# Patient Record
Sex: MALE | Race: WHITE | NOT HISPANIC OR LATINO
[De-identification: names, ages, dates, MRNs, and addresses within clinical notes are randomized per-mention and may not be internally consistent; named-entity substitution may affect disease eponyms.]

---

## 2019-07-16 ENCOUNTER — APPOINTMENT (OUTPATIENT)
Dept: CARDIOLOGY | Facility: CLINIC | Age: 69
End: 2019-07-16

## 2019-09-12 ENCOUNTER — APPOINTMENT (OUTPATIENT)
Dept: CARDIOLOGY | Facility: CLINIC | Age: 69
End: 2019-09-12
Payer: MEDICARE

## 2019-09-12 PROCEDURE — 93000 ELECTROCARDIOGRAM COMPLETE: CPT

## 2019-09-12 PROCEDURE — 99213 OFFICE O/P EST LOW 20 MIN: CPT

## 2019-12-27 ENCOUNTER — APPOINTMENT (OUTPATIENT)
Dept: CARDIOLOGY | Facility: CLINIC | Age: 69
End: 2019-12-27
Payer: MEDICARE

## 2019-12-27 PROCEDURE — 99213 OFFICE O/P EST LOW 20 MIN: CPT

## 2019-12-27 PROCEDURE — 93000 ELECTROCARDIOGRAM COMPLETE: CPT

## 2020-07-10 ENCOUNTER — APPOINTMENT (OUTPATIENT)
Dept: CARDIOLOGY | Facility: CLINIC | Age: 70
End: 2020-07-10
Payer: MEDICARE

## 2020-07-10 PROCEDURE — 93000 ELECTROCARDIOGRAM COMPLETE: CPT

## 2020-07-10 PROCEDURE — 99213 OFFICE O/P EST LOW 20 MIN: CPT

## 2020-09-05 ENCOUNTER — RX RENEWAL (OUTPATIENT)
Age: 70
End: 2020-09-05

## 2020-09-17 ENCOUNTER — APPOINTMENT (OUTPATIENT)
Dept: CARDIOLOGY | Facility: CLINIC | Age: 70
End: 2020-09-17
Payer: MEDICARE

## 2020-09-17 ENCOUNTER — RESULT REVIEW (OUTPATIENT)
Age: 70
End: 2020-09-17

## 2020-09-17 ENCOUNTER — OUTPATIENT (OUTPATIENT)
Dept: OUTPATIENT SERVICES | Facility: HOSPITAL | Age: 70
LOS: 1 days | Discharge: HOME | End: 2020-09-17
Payer: MEDICARE

## 2020-09-17 DIAGNOSIS — Z13.6 ENCOUNTER FOR SCREENING FOR CARDIOVASCULAR DISORDERS: ICD-10-CM

## 2020-09-17 PROCEDURE — 93306 TTE W/DOPPLER COMPLETE: CPT

## 2020-09-17 PROCEDURE — 78452 HT MUSCLE IMAGE SPECT MULT: CPT | Mod: 26

## 2020-10-01 DIAGNOSIS — R07.9 CHEST PAIN, UNSPECIFIED: ICD-10-CM

## 2020-10-01 DIAGNOSIS — I25.9 CHRONIC ISCHEMIC HEART DISEASE, UNSPECIFIED: ICD-10-CM

## 2020-10-13 ENCOUNTER — RECORD ABSTRACTING (OUTPATIENT)
Age: 70
End: 2020-10-13

## 2020-10-13 DIAGNOSIS — Z86.79 PERSONAL HISTORY OF OTHER DISEASES OF THE CIRCULATORY SYSTEM: ICD-10-CM

## 2020-10-13 DIAGNOSIS — Z86.39 PERSONAL HISTORY OF OTHER ENDOCRINE, NUTRITIONAL AND METABOLIC DISEASE: ICD-10-CM

## 2020-10-13 DIAGNOSIS — Z78.9 OTHER SPECIFIED HEALTH STATUS: ICD-10-CM

## 2020-10-19 ENCOUNTER — RX RENEWAL (OUTPATIENT)
Age: 70
End: 2020-10-19

## 2021-04-13 ENCOUNTER — RX RENEWAL (OUTPATIENT)
Age: 71
End: 2021-04-13

## 2021-04-21 ENCOUNTER — RX RENEWAL (OUTPATIENT)
Age: 71
End: 2021-04-21

## 2021-05-17 ENCOUNTER — RX RENEWAL (OUTPATIENT)
Age: 71
End: 2021-05-17

## 2021-06-02 ENCOUNTER — RX RENEWAL (OUTPATIENT)
Age: 71
End: 2021-06-02

## 2021-06-28 ENCOUNTER — RX RENEWAL (OUTPATIENT)
Age: 71
End: 2021-06-28

## 2021-07-08 ENCOUNTER — APPOINTMENT (OUTPATIENT)
Dept: CARDIOLOGY | Facility: CLINIC | Age: 71
End: 2021-07-08
Payer: MEDICARE

## 2021-07-08 VITALS
WEIGHT: 275 LBS | BODY MASS INDEX: 40.73 KG/M2 | HEIGHT: 69 IN | DIASTOLIC BLOOD PRESSURE: 70 MMHG | SYSTOLIC BLOOD PRESSURE: 140 MMHG

## 2021-07-08 VITALS
HEIGHT: 69 IN | WEIGHT: 260 LBS | BODY MASS INDEX: 38.51 KG/M2 | SYSTOLIC BLOOD PRESSURE: 140 MMHG | DIASTOLIC BLOOD PRESSURE: 80 MMHG

## 2021-07-08 VITALS — DIASTOLIC BLOOD PRESSURE: 74 MMHG | SYSTOLIC BLOOD PRESSURE: 138 MMHG

## 2021-07-08 DIAGNOSIS — R60.9 EDEMA, UNSPECIFIED: ICD-10-CM

## 2021-07-08 PROCEDURE — 93000 ELECTROCARDIOGRAM COMPLETE: CPT

## 2021-07-08 PROCEDURE — 99214 OFFICE O/P EST MOD 30 MIN: CPT

## 2021-07-08 NOTE — CARDIOLOGY SUMMARY
[de-identified] : 07/08/21:\par SR, no ST changes.. [de-identified] : 09/17/20:\par LVEF 63%, G2DD\par Moderate LAE\par Mild MR. [de-identified] : Adenosine MPI 09/17/21:\par No ischemia.

## 2021-07-08 NOTE — HISTORY OF PRESENT ILLNESS
[FreeTextEntry1] : 71-yo male with h/o CAD, CABG in 2005 (LIMA to LAD, sequential SVGs to D1 and OM2 from LIMA). LHC in 2014: patent grafts and RCA, subtotal stenosis of OM1 (stented successfully with UZMA).\par \par Denies chest pain, HERNANDEZ or palpitations. BP has been well controlled.\par \par Left TKR uncomplicated but ankle edema since then.\par \par GFR 88\par Hb A1c 5.5\par LDL 69\par HDL 53\par Triglycerides 94\par TSH 4.2.\par

## 2021-07-08 NOTE — REVIEW OF SYSTEMS
[Lower Ext Edema] : lower extremity edema [Negative] : Neurological [Rash] : no rash [Anxiety] : no anxiety [Easy Bleeding] : no tendency for easy bleeding [Easy Bruising] : no tendency for easy bruising

## 2021-07-08 NOTE — ASSESSMENT
[FreeTextEntry1] : 71-yo male with h/o CAD, s/p CABG. No angina, no evidence of ischemia.\par HTN uncotrolled.\par Mild edema.\par Obesity.\par Hyperlipidemia.\par \par Plan:\par Diet, weight loss discussed with patient.\par Continue Carvedilol, Amlodipine for now.\par Increase Valsartan HCT to 320/25 mg daily.\par Fasting blood work ordered (prior to next visit).\par F/u in 4 months.\par \par Mark Anthony Enciso MD\par

## 2021-07-08 NOTE — PHYSICAL EXAM
[Well Developed] : well developed [Well Nourished] : well nourished [No Acute Distress] : no acute distress [Normal Conjunctiva] : normal conjunctiva [Normal Venous Pressure] : normal venous pressure [No Carotid Bruit] : no carotid bruit [Normal S1, S2] : normal S1, S2 [No Rub] : no rub [S4] : S4 [Clear Lung Fields] : clear lung fields [Good Air Entry] : good air entry [No Respiratory Distress] : no respiratory distress  [Soft] : abdomen soft [Non Tender] : non-tender [Normal Bowel Sounds] : normal bowel sounds [Normal Gait] : normal gait [No Cyanosis] : no cyanosis [No Clubbing] : no clubbing [No Varicosities] : no varicosities [Edema ___] : edema [unfilled] [No Rash] : no rash [Moves all extremities] : moves all extremities [Normal Speech] : normal speech [Alert and Oriented] : alert and oriented [Normal memory] : normal memory

## 2021-07-09 ENCOUNTER — RESULT CHARGE (OUTPATIENT)
Age: 71
End: 2021-07-09

## 2021-11-07 ENCOUNTER — RX RENEWAL (OUTPATIENT)
Age: 71
End: 2021-11-07

## 2021-12-20 DIAGNOSIS — Z91.89 OTHER SPECIFIED PERSONAL RISK FACTORS, NOT ELSEWHERE CLASSIFIED: ICD-10-CM

## 2021-12-31 ENCOUNTER — RX RENEWAL (OUTPATIENT)
Age: 71
End: 2021-12-31

## 2022-03-22 ENCOUNTER — RX RENEWAL (OUTPATIENT)
Age: 72
End: 2022-03-22

## 2022-06-16 ENCOUNTER — APPOINTMENT (OUTPATIENT)
Dept: CARDIOLOGY | Facility: CLINIC | Age: 72
End: 2022-06-16
Payer: MEDICARE

## 2022-06-16 ENCOUNTER — RESULT CHARGE (OUTPATIENT)
Age: 72
End: 2022-06-16

## 2022-06-16 VITALS
BODY MASS INDEX: 37.18 KG/M2 | WEIGHT: 251 LBS | SYSTOLIC BLOOD PRESSURE: 116 MMHG | HEART RATE: 63 BPM | HEIGHT: 69 IN | DIASTOLIC BLOOD PRESSURE: 60 MMHG

## 2022-06-16 VITALS — BODY MASS INDEX: 37.07 KG/M2 | HEIGHT: 69 IN | DIASTOLIC BLOOD PRESSURE: 68 MMHG | SYSTOLIC BLOOD PRESSURE: 122 MMHG

## 2022-06-16 PROCEDURE — 99214 OFFICE O/P EST MOD 30 MIN: CPT

## 2022-06-16 PROCEDURE — 93000 ELECTROCARDIOGRAM COMPLETE: CPT

## 2022-06-16 RX ORDER — AMOXICILLIN AND CLAVULANATE POTASSIUM 875; 125 MG/1; MG/1
875-125 TABLET, COATED ORAL
Qty: 20 | Refills: 0 | Status: DISCONTINUED | COMMUNITY
Start: 2022-04-01

## 2022-06-16 RX ORDER — ASPIRIN 81 MG
81 TABLET, DELAYED RELEASE (ENTERIC COATED) ORAL DAILY
Refills: 0 | Status: DISCONTINUED | COMMUNITY
End: 2022-06-16

## 2022-06-16 RX ORDER — TIZANIDINE 4 MG/1
4 TABLET ORAL
Qty: 30 | Refills: 0 | Status: DISCONTINUED | COMMUNITY
Start: 2022-04-21

## 2022-06-16 RX ORDER — CEPHALEXIN 250 MG/1
250 CAPSULE ORAL
Qty: 21 | Refills: 0 | Status: DISCONTINUED | COMMUNITY
Start: 2022-06-09

## 2022-06-16 RX ORDER — RIVAROXABAN 20 MG/1
20 TABLET, FILM COATED ORAL
Qty: 90 | Refills: 1 | Status: DISCONTINUED | COMMUNITY

## 2022-06-16 RX ORDER — RIVAROXABAN 20 MG/1
20 TABLET, FILM COATED ORAL
Qty: 30 | Refills: 0 | Status: DISCONTINUED | COMMUNITY
Start: 2022-04-22

## 2022-06-16 RX ORDER — SULFAMETHOXAZOLE AND TRIMETHOPRIM 800; 160 MG/1; MG/1
800-160 TABLET ORAL
Qty: 6 | Refills: 0 | Status: DISCONTINUED | COMMUNITY
Start: 2022-04-14

## 2022-06-16 RX ORDER — DIPHENHYDRAMINE HYDROCHLORIDE 25 MG/1
25 CAPSULE ORAL
Qty: 60 | Refills: 0 | Status: DISCONTINUED | COMMUNITY
Start: 2022-04-01

## 2022-06-16 NOTE — ASSESSMENT
[FreeTextEntry1] : 73 y/o male with h/o CAD, s/p CABG. No angina, no evidence of ischemia.\par HTN controlled now.\par Persistent A-fib, 1 episode. SR now.\par Obesity.\par Hyperlipidemia.\par \par Plan:\par Diet, weight loss discussed with patient.\par Will try to replace Xarelto with Eliquis.\par Resume ASA 81 mg daily.\par Fasting blood work ordered (prior to next visit).\par F/u in 1 month.\par \par Mark Anthony Enciso MD\par

## 2022-06-16 NOTE — CARDIOLOGY SUMMARY
[de-identified] : 06/16/22:\par SR, no ST changes.. [de-identified] : 09/17/20:\par LVEF 63%, G2DD\par Moderate LAE\par Mild MR. [de-identified] : Adenosine MPI 09/17/21:\par No ischemia.

## 2022-06-16 NOTE — HISTORY OF PRESENT ILLNESS
[FreeTextEntry1] : 72-yo male with h/o CAD, CABG in 2005 (LIMA to LAD, sequential SVGs to D1 and OM2 from LIMA). LHC in 2014: patent grafts and RCA, subtotal stenosis of OM1 (stented successfully with UZMA).\par \par Denies chest pain, HERNANDEZ or palpitations. BP has been well controlled.\par \par Was diagnosed with A-fib in [place], s/p successful DCCV. C/o abdominal discomfort since starting Xarelto.\par \par \par \par

## 2022-08-09 ENCOUNTER — RESULT CHARGE (OUTPATIENT)
Age: 72
End: 2022-08-09

## 2022-08-09 ENCOUNTER — APPOINTMENT (OUTPATIENT)
Dept: CARDIOLOGY | Facility: CLINIC | Age: 72
End: 2022-08-09

## 2022-08-09 VITALS
HEIGHT: 69 IN | HEART RATE: 69 BPM | DIASTOLIC BLOOD PRESSURE: 70 MMHG | BODY MASS INDEX: 37.77 KG/M2 | SYSTOLIC BLOOD PRESSURE: 130 MMHG | WEIGHT: 255 LBS

## 2022-08-09 PROCEDURE — 93000 ELECTROCARDIOGRAM COMPLETE: CPT

## 2022-08-09 PROCEDURE — 99214 OFFICE O/P EST MOD 30 MIN: CPT

## 2022-08-09 RX ORDER — HYDROCODONE BITARTRATE AND ACETAMINOPHEN 7.5; 325 MG/1; MG/1
7.5-325 TABLET ORAL
Qty: 9 | Refills: 0 | Status: DISCONTINUED | COMMUNITY
Start: 2022-04-08 | End: 2022-08-09

## 2022-08-09 NOTE — CARDIOLOGY SUMMARY
[de-identified] : 06/16/22:\par SR, no ST changes..\par 08/09/2022: SR, nonspecific T wave changes [de-identified] : 09/17/20:\par LVEF 63%, G2DD\par Moderate LAE\par Mild MR. [de-identified] : Adenosine MPI 09/17/21:\par No ischemia.

## 2022-08-09 NOTE — HISTORY OF PRESENT ILLNESS
[FreeTextEntry1] : 72-yo male with h/o CAD, CABG in 2005 (LIMA to LAD, sequential SVGs to D1 and OM2 from LIMA). LHC in 2014: patent grafts and RCA, subtotal stenosis of OM1 (stented successfully with UZMA).\par \par Denies chest pain, HERNANDEZ or palpitations. BP has been well controlled.\par \par Was diagnosed with A-fib in [place], s/p successful DCCV. Tolerates Eliquis well, stopped Eligus yesterday due to upcoming spine surgical procedure ([facility]). Needs cardiac clearance. \par \par Denies chest pain, SOB, dizziness, palpitations. Remains in NSR. \par \par \par

## 2022-08-09 NOTE — REVIEW OF SYSTEMS
[Lower Ext Edema] : lower extremity edema [Negative] : Neurological [Anxiety] : no anxiety [Easy Bleeding] : no tendency for easy bleeding [Easy Bruising] : no tendency for easy bruising [FreeTextEntry9] : Back pain

## 2022-08-09 NOTE — ASSESSMENT
[FreeTextEntry1] : 71 y/o male with h/o CAD, s/p CABG. No angina, no evidence of ischemia.\par HTN controlled now.\par Persistent A-fib, 1 episode. SR now.\par Obesity.\par Hyperlipidemia.\par Anemia.\par Low risk for cardiovascular complications of minimally invasive spinal procedure ([facility]eder[facility]).\par \par Plan:\par Proceed with surgery.\par Resume ASA and Eliquis after the procedure.\par Repeat CBC with iron studies, B12 and Folate prior to next visit.\par GI f/u for repeat colonoscopy.\par F/u in 2 months.\par \par Mark Anthony Enciso MD\par

## 2022-08-26 ENCOUNTER — APPOINTMENT (OUTPATIENT)
Dept: CARDIOLOGY | Facility: CLINIC | Age: 72
End: 2022-08-26

## 2022-08-26 VITALS
BODY MASS INDEX: 36.73 KG/M2 | RESPIRATION RATE: 16 BRPM | TEMPERATURE: 97.5 F | WEIGHT: 248 LBS | HEART RATE: 62 BPM | SYSTOLIC BLOOD PRESSURE: 112 MMHG | DIASTOLIC BLOOD PRESSURE: 70 MMHG | HEIGHT: 69 IN

## 2022-08-26 DIAGNOSIS — I48.92 UNSPECIFIED ATRIAL FLUTTER: ICD-10-CM

## 2022-08-26 LAB
ANION GAP SERPL CALC-SCNC: 16 MMOL/L
BASOPHILS # BLD AUTO: 0.1 K/UL
BASOPHILS NFR BLD AUTO: 1 %
BUN SERPL-MCNC: 19 MG/DL
CALCIUM SERPL-MCNC: 9.8 MG/DL
CHLORIDE SERPL-SCNC: 101 MMOL/L
CO2 SERPL-SCNC: 22 MMOL/L
CREAT SERPL-MCNC: 1.1 MG/DL
EGFR: 71 ML/MIN/1.73M2
EOSINOPHIL # BLD AUTO: 0.23 K/UL
EOSINOPHIL NFR BLD AUTO: 2.3 %
GLUCOSE SERPL-MCNC: 99 MG/DL
HCT VFR BLD CALC: 37.8 %
HGB BLD-MCNC: 11.8 G/DL
IMM GRANULOCYTES NFR BLD AUTO: 0.3 %
INR PPP: 1.54 RATIO
LYMPHOCYTES # BLD AUTO: 2.32 K/UL
LYMPHOCYTES NFR BLD AUTO: 23.1 %
MAN DIFF?: NORMAL
MCHC RBC-ENTMCNC: 23.2 PG
MCHC RBC-ENTMCNC: 31.2 G/DL
MCV RBC AUTO: 74.4 FL
MONOCYTES # BLD AUTO: 0.83 K/UL
MONOCYTES NFR BLD AUTO: 8.3 %
NEUTROPHILS # BLD AUTO: 6.54 K/UL
NEUTROPHILS NFR BLD AUTO: 65 %
PLATELET # BLD AUTO: 368 K/UL
POTASSIUM SERPL-SCNC: 4.2 MMOL/L
PT BLD: 17.6 SEC
RBC # BLD: 5.08 M/UL
RBC # FLD: 16.7 %
SODIUM SERPL-SCNC: 139 MMOL/L
WBC # FLD AUTO: 10.05 K/UL

## 2022-08-26 PROCEDURE — 99214 OFFICE O/P EST MOD 30 MIN: CPT

## 2022-08-26 PROCEDURE — 93000 ELECTROCARDIOGRAM COMPLETE: CPT

## 2022-08-26 NOTE — ASSESSMENT
[FreeTextEntry1] : 71 y/o male with h/o CAD, s/p CABG. No angina, no evidence of ischemia.\par HTN controlled now.\par Persistent A-fib, A-flutter now.\par Obesity.\par Hyperlipidemia.\par Anemia.\par \par \par Plan:\par Continue treatment.\par Will schedule MANDY/CV next week. Procedure discussed.\par EP evaluation for A-fib/flutter ablation.  \par F/u after cardioversion.\par \par Mark Anthony Enciso MD\par

## 2022-08-26 NOTE — HISTORY OF PRESENT ILLNESS
[FreeTextEntry1] : 72-yo male with h/o CAD, CABG in 2005 (LIMA to LAD, sequential SVGs to D1 and OM2 from LIMA). LHC in 2014: patent grafts and RCA, subtotal stenosis of OM1 (stented successfully with UZMA).\par \par Denies chest pain, HERNANDEZ or palpitations. BP has been well controlled.\par \par Was diagnosed with A-fib in [place], s/p successful DCCV. Tolerates Eliquis well. S/p spine surgical procedure ([facility]), still co back pain.\par \par Denies palpitations now but A-fib has been reported by his [employer] device since Monday.\par \par

## 2022-08-26 NOTE — PHYSICAL EXAM
[Well Developed] : well developed [Well Nourished] : well nourished [No Acute Distress] : no acute distress [Normal Conjunctiva] : normal conjunctiva [Normal Venous Pressure] : normal venous pressure [No Carotid Bruit] : no carotid bruit [No Rub] : no rub [S4] : S4 [Clear Lung Fields] : clear lung fields [Good Air Entry] : good air entry [No Respiratory Distress] : no respiratory distress  [Soft] : abdomen soft [Non Tender] : non-tender [Normal Bowel Sounds] : normal bowel sounds [Normal Gait] : normal gait [No Cyanosis] : no cyanosis [No Clubbing] : no clubbing [No Varicosities] : no varicosities [Edema ___] : edema [unfilled] [No Rash] : no rash [Moves all extremities] : moves all extremities [Normal Speech] : normal speech [Alert and Oriented] : alert and oriented [Normal memory] : normal memory [de-identified] : irregular S1 and S2

## 2022-08-29 ENCOUNTER — RESULT CHARGE (OUTPATIENT)
Age: 72
End: 2022-08-29

## 2022-08-29 ENCOUNTER — LABORATORY RESULT (OUTPATIENT)
Age: 72
End: 2022-08-29

## 2022-08-31 ENCOUNTER — OUTPATIENT (OUTPATIENT)
Dept: OUTPATIENT SERVICES | Facility: HOSPITAL | Age: 72
LOS: 1 days | Discharge: HOME | End: 2022-08-31

## 2022-08-31 VITALS
OXYGEN SATURATION: 96 % | RESPIRATION RATE: 16 BRPM | HEART RATE: 73 BPM | HEIGHT: 69 IN | SYSTOLIC BLOOD PRESSURE: 125 MMHG | WEIGHT: 248.02 LBS | DIASTOLIC BLOOD PRESSURE: 62 MMHG | TEMPERATURE: 98 F

## 2022-08-31 DIAGNOSIS — Z96.652 PRESENCE OF LEFT ARTIFICIAL KNEE JOINT: Chronic | ICD-10-CM

## 2022-08-31 DIAGNOSIS — R06.02 SHORTNESS OF BREATH: ICD-10-CM

## 2022-08-31 DIAGNOSIS — Z95.1 PRESENCE OF AORTOCORONARY BYPASS GRAFT: Chronic | ICD-10-CM

## 2022-08-31 PROCEDURE — 93320 DOPPLER ECHO COMPLETE: CPT | Mod: 26

## 2022-08-31 PROCEDURE — 93312 ECHO TRANSESOPHAGEAL: CPT | Mod: 26,XU

## 2022-08-31 PROCEDURE — 93325 DOPPLER ECHO COLOR FLOW MAPG: CPT | Mod: 26

## 2022-08-31 PROCEDURE — 93010 ELECTROCARDIOGRAM REPORT: CPT

## 2022-08-31 PROCEDURE — 92960 CARDIOVERSION ELECTRIC EXT: CPT

## 2022-08-31 NOTE — H&P CARDIOLOGY - HISTORY OF PRESENT ILLNESS
72 y.o male patient with PMH CAD s/p CABG in 2005, atrial fibrillation, HTN, HLD presents for MANDY +/- DCCV. He complains of back pain that limits his activity associated with some shortness of breath.     REVIEW OF SYSTEMS:  CONSTITUTIONAL: No weakness, fevers or chills  EYES/ENT: No visual changes;  No vertigo or throat pain   NECK: No pain or stiffness  RESPIRATORY: No cough, wheezing, hemoptysis; SEE HPI  CARDIOVASCULAR: SEE HPI  GASTROINTESTINAL: No abdominal or epigastric pain. No nausea, vomiting, or hematemesis; No diarrhea or constipation. No melena or hematochezia.  GENITOURINARY: No dysuria, frequency or hematuria  NEUROLOGICAL: No numbness or weakness  SKIN: No itching, rashes      PHYSICAL EXAM:  T(C): 36.8 (08-31-22 @ 07:34), Max: 36.8 (08-31-22 @ 07:34)  HR: 73 (08-31-22 @ 07:34) (73 - 73)  BP: 125/62 (08-31-22 @ 07:34) (125/62 - 125/62)  RR: 16 (08-31-22 @ 07:34) (16 - 16)  SpO2: 96% (08-31-22 @ 07:34) (96% - 96%)  GENERAL: NAD  HEAD:  Atraumatic, Normocephalic  EYES: conjunctiva and sclera clear  NECK: No JVD  CHEST/LUNG: Clear to auscultation bilaterally; No wheeze  HEART: Regular rate and rhythm; No murmurs  ABDOMEN: Soft, Nontender, Nondistended; Bowel sounds present  EXTREMITIES: Mild LE edema  NEUROLOGY:  A&Ox3, appropriate  SKIN: No rashes or lesions       72 y.o male patient with PMH CAD s/p CABG in 2005, atrial fibrillation, HTN, HLD presents for MANDY +/- DCCV. He complains of back pain that limits his activity associated with some shortness of breath.     REVIEW OF SYSTEMS:  CONSTITUTIONAL: No weakness, fevers or chills  EYES/ENT: No visual changes;  No vertigo or throat pain   NECK: No pain or stiffness  RESPIRATORY: No cough, wheezing, hemoptysis; SEE HPI  CARDIOVASCULAR: SEE HPI  GASTROINTESTINAL: No abdominal or epigastric pain. No nausea, vomiting, or hematemesis; No diarrhea or constipation. No melena or hematochezia.  GENITOURINARY: No dysuria, frequency or hematuria  NEUROLOGICAL: No numbness or weakness  SKIN: No itching, rashes      PHYSICAL EXAM:  T(C): 36.8 (08-31-22 @ 07:34), Max: 36.8 (08-31-22 @ 07:34)  HR: 73 (08-31-22 @ 07:34) (73 - 73)  BP: 125/62 (08-31-22 @ 07:34) (125/62 - 125/62)  RR: 16 (08-31-22 @ 07:34) (16 - 16)  SpO2: 96% (08-31-22 @ 07:34) (96% - 96%)  GENERAL: NAD  HEAD:  Atraumatic, Normocephalic  EYES: conjunctiva and sclera clear  NECK: No JVD  CHEST/LUNG: Clear to auscultation bilaterally  HEART: Irregular  ABDOMEN: Soft, Nontender, Nondistended  EXTREMITIES: Mild LE edema  NEUROLOGY:  A&Ox3, appropriate  SKIN: No rashes or lesions

## 2022-08-31 NOTE — CHART NOTE - NSCHARTNOTEFT_GEN_A_CORE
POST OPERATIVE PROCEDURAL DOCUMENTATION  PRE-OP DIAGNOSIS: Atrial fibrillation    POST-OP DIAGNOSIS: No thrombus; successful cardioversion to normal sinus rhythm    PROCEDURE: Transesophageal echocardiogram    Primary Physician: Dr. Enciso  Fellow: Dr. Sneed    ANESTHESIA TYPE  [  ] General Anesthesia  [ x ] Conscious Sedation  [  ] Local/Regional    CONDITION  [  ] Critical  [  ] Serious  [  ] Fair  [X] Good    SPECIMENS REMOVED (IF APPLICABLE): N/A    IMPLANTS (IF APPLICABLE): None    ESTIMATED BLOOD LOSS: None    COMPLICATIONS: None      FINDINGS:    After risks and benefits of procedures were explained, informed consent was obtained and placed in chart. Refer to Anesthesia note for sedation details.  The MANDY probe was passed into the esophagus without difficulty.  Transesophageal images were obtained.  The MANDY probe was removed without difficulty and examined.  There was no evidence for bleeding.  The patient tolerated the procedure well without any immediate MANDY-related complications.      Preliminary Findings:  LA: Enlarged  BARRY: Left atrial appendage was clear of clot and smoke.  LV: LVEF was estimated to be normal  MV: Mild MR  AV: Trace AI  RA: Normal  TV: Trace TR.   PV: Trace PI.   IAS: no PFO. No R-> L shunt with color doppler  There was mild, non-mobile atheroma seen in the thoracic aorta.     Patient successfully converted to sinus rhythm with synchronized  200J of direct current cardioversion.    DIAGNOSIS/IMPRESSION:  - No thrombus; successful cardioversion to normal sinus rhythm    PLAN OF CARE:  - D/C home today  - Continue anticoagulation for stroke prevention  - Outpatient follow-up

## 2022-08-31 NOTE — ASU PATIENT PROFILE, ADULT - NSICDXPASTSURGICALHX_GEN_ALL_CORE_FT
PAST SURGICAL HISTORY:  H/O total knee replacement, left     S/P CABG (coronary artery bypass graft) 2005

## 2022-08-31 NOTE — ASU PATIENT PROFILE, ADULT - FALL HARM RISK - UNIVERSAL INTERVENTIONS
Bed in lowest position, wheels locked, appropriate side rails in place/Call bell, personal items and telephone in reach/Instruct patient to call for assistance before getting out of bed or chair/Non-slip footwear when patient is out of bed/Diagonal to call system/Physically safe environment - no spills, clutter or unnecessary equipment/Purposeful Proactive Rounding/Room/bathroom lighting operational, light cord in reach

## 2022-08-31 NOTE — ASU PATIENT PROFILE, ADULT - NSICDXPASTMEDICALHX_GEN_ALL_CORE_FT
PAST MEDICAL HISTORY:  Anemia     Atrial flutter     CAD (coronary artery disease)     Hyperlipidemia     Hypertension

## 2022-09-01 ENCOUNTER — APPOINTMENT (OUTPATIENT)
Dept: CARDIOLOGY | Facility: CLINIC | Age: 72
End: 2022-09-01

## 2022-09-01 VITALS
DIASTOLIC BLOOD PRESSURE: 71 MMHG | TEMPERATURE: 97.2 F | SYSTOLIC BLOOD PRESSURE: 126 MMHG | HEART RATE: 67 BPM | BODY MASS INDEX: 36.73 KG/M2 | HEIGHT: 69 IN | WEIGHT: 248 LBS

## 2022-09-01 PROCEDURE — 93000 ELECTROCARDIOGRAM COMPLETE: CPT

## 2022-09-01 PROCEDURE — 99205 OFFICE O/P NEW HI 60 MIN: CPT

## 2022-09-01 RX ORDER — VALSARTAN AND HYDROCHLOROTHIAZIDE 320; 12.5 MG/1; MG/1
320-12.5 TABLET, FILM COATED ORAL
Qty: 90 | Refills: 1 | Status: COMPLETED | COMMUNITY
Start: 2021-11-07 | End: 2022-09-01

## 2022-09-01 NOTE — ASSESSMENT
[FreeTextEntry1] : AF\par - CHADVACS 3; cont AC\par - Which include rhythmic medication or obligation. Patient is intolerant to enter medication and prefers not to take them. I discussed with patient possibly of ablation and enrollment in the iclass trial. A provided patient with the consent form for him to be the home. He is willing to proceed.\par - echo\par \par \par AF ablation\par \par I have discussed different treatment options with NANY PALMER including anti-arrhythmic medications or ablation.  After a long discussion, the patient would like to proceed with an ablation.  I have explained the risks and benefits of the procedure to the patient.  There is approximately 1-2% chance of any major cardiovascular complication to occur. Complications include, but are not limited to infection, bleeding, damage to the vessels, hole in the heart, stroke, death and heart attack. There is also 1% risk of phrenic nerve injury.  The patient understands the risk and would like to proceed with the procedure. Patient had opportunity to ask questions. Patient indicated that all of his questions were answered to his satisfaction and verbalized understanding.\par \par

## 2022-09-01 NOTE — REVIEW OF SYSTEMS
[Lower Ext Edema] : lower extremity edema [Anxiety] : no anxiety [Easy Bleeding] : no tendency for easy bleeding [Easy Bruising] : no tendency for easy bruising [Negative] : Neurological [FreeTextEntry9] : Back pain

## 2022-09-01 NOTE — PHYSICAL EXAM
[Well Developed] : well developed [Well Nourished] : well nourished [No Acute Distress] : no acute distress [Normal Conjunctiva] : normal conjunctiva [Normal Venous Pressure] : normal venous pressure [No Carotid Bruit] : no carotid bruit [No Rub] : no rub [S4] : S4 [Clear Lung Fields] : clear lung fields [Good Air Entry] : good air entry [No Respiratory Distress] : no respiratory distress  [Soft] : abdomen soft [Non Tender] : non-tender [Normal Bowel Sounds] : normal bowel sounds [Normal Gait] : normal gait [No Cyanosis] : no cyanosis [No Clubbing] : no clubbing [No Varicosities] : no varicosities [Edema ___] : edema [unfilled] [No Rash] : no rash [Moves all extremities] : moves all extremities [Normal Speech] : normal speech [Alert and Oriented] : alert and oriented [Normal memory] : normal memory [de-identified] : irregular S1 and S2

## 2022-09-01 NOTE — HISTORY OF PRESENT ILLNESS
[FreeTextEntry1] : 72-yo male with h/o CAD, CABG in 2005 (LIMA to LAD, sequential SVGs to D1 and OM2 from LIMA). LHC in 2014: patent grafts and RCA, subtotal stenosis of OM1 (stented successfully with UZMA).\par \par Denies chest pain, HERNANDEZ or palpitations. BP has been well controlled.\par \par Was diagnosed with persistent A-fib in [place], s/p successful DCCV. Tolerates Eliquis well. S/p spine surgical procedure ([facility]), still co back pain.\par \par Again recurnac of sympotatic AF and s/p DCCV. Patient  come to the office for evaluation of his atrial fibrillation. They should feels much better after cardioversion has more energy and does not feel tired as he did when he is in atrial fibrillation.\par \par EKG SR\par \par

## 2022-09-01 NOTE — CARDIOLOGY SUMMARY
[de-identified] : 08/09/2022: \par Atypical A-flutter, VR 62/min, nonspecific T wave changes [de-identified] : 09/17/20:\par LVEF 63%, G2DD\par Moderate LAE\par Mild MR. [de-identified] : Adenosine MPI 09/17/21:\par No ischemia.

## 2022-09-08 ENCOUNTER — APPOINTMENT (OUTPATIENT)
Dept: CARDIOLOGY | Facility: CLINIC | Age: 72
End: 2022-09-08

## 2022-09-08 PROCEDURE — 93306 TTE W/DOPPLER COMPLETE: CPT

## 2022-09-22 PROBLEM — I48.92 UNSPECIFIED ATRIAL FLUTTER: Chronic | Status: ACTIVE | Noted: 2022-08-31

## 2022-09-22 PROBLEM — D64.9 ANEMIA, UNSPECIFIED: Chronic | Status: ACTIVE | Noted: 2022-08-31

## 2022-09-22 PROBLEM — E78.5 HYPERLIPIDEMIA, UNSPECIFIED: Chronic | Status: ACTIVE | Noted: 2022-08-31

## 2022-09-22 PROBLEM — I25.10 ATHEROSCLEROTIC HEART DISEASE OF NATIVE CORONARY ARTERY WITHOUT ANGINA PECTORIS: Chronic | Status: ACTIVE | Noted: 2022-08-31

## 2022-09-22 PROBLEM — I10 ESSENTIAL (PRIMARY) HYPERTENSION: Chronic | Status: ACTIVE | Noted: 2022-08-31

## 2022-09-29 ENCOUNTER — APPOINTMENT (OUTPATIENT)
Dept: CARDIOLOGY | Facility: CLINIC | Age: 72
End: 2022-09-29

## 2022-09-29 VITALS
SYSTOLIC BLOOD PRESSURE: 118 MMHG | HEART RATE: 62 BPM | BODY MASS INDEX: 37.92 KG/M2 | HEIGHT: 69 IN | WEIGHT: 256 LBS | DIASTOLIC BLOOD PRESSURE: 68 MMHG

## 2022-09-29 PROCEDURE — 99214 OFFICE O/P EST MOD 30 MIN: CPT

## 2022-09-29 PROCEDURE — 93000 ELECTROCARDIOGRAM COMPLETE: CPT

## 2022-09-29 NOTE — HISTORY OF PRESENT ILLNESS
[FreeTextEntry1] : 72-yo male with h/o CAD, CABG in 2005 (LIMA to LAD, sequential SVGs to D1 and OM2 from LIMA). LHC in 2014: patent grafts and RCA, subtotal stenosis of OM1 (stented successfully with UZMA).\par \par Denies chest pain, HERNANDEZ or palpitations. BP has been well controlled.\par \par Was diagnosed with A-fib in [place], s/p successful DCCV. Tolerates Eliquis well. S/p spine surgical procedure ([facility]), still co back pain.\par \par Now for f/u post DCCE in sinus today and feeling well. Awaiting ablation on Oct 31.  \par \par

## 2022-09-29 NOTE — ASSESSMENT
[FreeTextEntry1] : 71 y/o male with h/o CAD, s/p CABG. No angina, no evidence of ischemia.\par HTN controlled now.\par Persistent A-fib, A-flutter. S/p CV. Awaiting ablation.\par Obesity.\par Hyperlipidemia.\par \par \par Plan:\par Continue treatment.\par Diet, weight loss discussed.\par F/u in 3 months.\par \par Mark Anthony Enciso MD\par

## 2022-09-29 NOTE — PHYSICAL EXAM
[Well Developed] : well developed [Well Nourished] : well nourished [No Acute Distress] : no acute distress [Normal Conjunctiva] : normal conjunctiva [Normal Venous Pressure] : normal venous pressure [No Carotid Bruit] : no carotid bruit [No Rub] : no rub [S4] : S4 [Clear Lung Fields] : clear lung fields [Good Air Entry] : good air entry [No Respiratory Distress] : no respiratory distress  [Soft] : abdomen soft [Non Tender] : non-tender [Normal Bowel Sounds] : normal bowel sounds [Normal Gait] : normal gait [No Cyanosis] : no cyanosis [No Clubbing] : no clubbing [No Varicosities] : no varicosities [Edema ___] : edema [unfilled] [No Rash] : no rash [Moves all extremities] : moves all extremities [Normal Speech] : normal speech [Alert and Oriented] : alert and oriented [Normal memory] : normal memory [Normal S1, S2] : normal S1, S2

## 2022-09-29 NOTE — CARDIOLOGY SUMMARY
[de-identified] : 09/29/2022: \par SR 62/min, nonspecific T wave changes [de-identified] : 09/08/22:\par LVEF 60%, G2DD\par Mod LAE\par Mild MR, TR.\par \par 09/17/20:\par LVEF 63%, G2DD\par Moderate LAE\par Mild MR. [de-identified] : Adenosine MPI 09/17/21:\par No ischemia.

## 2022-09-30 ENCOUNTER — RESULT CHARGE (OUTPATIENT)
Age: 72
End: 2022-09-30

## 2022-10-17 ENCOUNTER — OUTPATIENT (OUTPATIENT)
Dept: OUTPATIENT SERVICES | Facility: HOSPITAL | Age: 72
LOS: 1 days | Discharge: HOME | End: 2022-10-17

## 2022-10-17 ENCOUNTER — RESULT REVIEW (OUTPATIENT)
Age: 72
End: 2022-10-17

## 2022-10-17 VITALS
RESPIRATION RATE: 16 BRPM | OXYGEN SATURATION: 97 % | DIASTOLIC BLOOD PRESSURE: 67 MMHG | HEART RATE: 68 BPM | TEMPERATURE: 97 F | WEIGHT: 257.94 LBS | HEIGHT: 69 IN | SYSTOLIC BLOOD PRESSURE: 143 MMHG

## 2022-10-17 DIAGNOSIS — Z96.652 PRESENCE OF LEFT ARTIFICIAL KNEE JOINT: Chronic | ICD-10-CM

## 2022-10-17 DIAGNOSIS — Z98.890 OTHER SPECIFIED POSTPROCEDURAL STATES: Chronic | ICD-10-CM

## 2022-10-17 DIAGNOSIS — Z95.1 PRESENCE OF AORTOCORONARY BYPASS GRAFT: Chronic | ICD-10-CM

## 2022-10-17 DIAGNOSIS — Z01.818 ENCOUNTER FOR OTHER PREPROCEDURAL EXAMINATION: ICD-10-CM

## 2022-10-17 DIAGNOSIS — I48.0 PAROXYSMAL ATRIAL FIBRILLATION: ICD-10-CM

## 2022-10-17 LAB
ALBUMIN SERPL ELPH-MCNC: 3.8 G/DL — SIGNIFICANT CHANGE UP (ref 3.5–5.2)
ALP SERPL-CCNC: 83 U/L — SIGNIFICANT CHANGE UP (ref 30–115)
ALT FLD-CCNC: 16 U/L — SIGNIFICANT CHANGE UP (ref 0–41)
ANION GAP SERPL CALC-SCNC: 13 MMOL/L — SIGNIFICANT CHANGE UP (ref 7–14)
APTT BLD: 34.3 SEC — SIGNIFICANT CHANGE UP (ref 27–39.2)
AST SERPL-CCNC: 13 U/L — SIGNIFICANT CHANGE UP (ref 0–41)
BASOPHILS # BLD AUTO: 0.07 K/UL — SIGNIFICANT CHANGE UP (ref 0–0.2)
BASOPHILS NFR BLD AUTO: 0.7 % — SIGNIFICANT CHANGE UP (ref 0–1)
BILIRUB SERPL-MCNC: 0.4 MG/DL — SIGNIFICANT CHANGE UP (ref 0.2–1.2)
BUN SERPL-MCNC: 17 MG/DL — SIGNIFICANT CHANGE UP (ref 10–20)
CALCIUM SERPL-MCNC: 8.6 MG/DL — SIGNIFICANT CHANGE UP (ref 8.4–10.5)
CHLORIDE SERPL-SCNC: 103 MMOL/L — SIGNIFICANT CHANGE UP (ref 98–110)
CO2 SERPL-SCNC: 23 MMOL/L — SIGNIFICANT CHANGE UP (ref 17–32)
CREAT SERPL-MCNC: 0.9 MG/DL — SIGNIFICANT CHANGE UP (ref 0.7–1.5)
EGFR: 91 ML/MIN/1.73M2 — SIGNIFICANT CHANGE UP
EOSINOPHIL # BLD AUTO: 0.22 K/UL — SIGNIFICANT CHANGE UP (ref 0–0.7)
EOSINOPHIL NFR BLD AUTO: 2.3 % — SIGNIFICANT CHANGE UP (ref 0–8)
GLUCOSE SERPL-MCNC: 138 MG/DL — HIGH (ref 70–99)
HCT VFR BLD CALC: 34.4 % — LOW (ref 42–52)
HGB BLD-MCNC: 10.7 G/DL — LOW (ref 14–18)
IMM GRANULOCYTES NFR BLD AUTO: 0.9 % — HIGH (ref 0.1–0.3)
INR BLD: 1.28 RATIO — SIGNIFICANT CHANGE UP (ref 0.65–1.3)
LYMPHOCYTES # BLD AUTO: 2.28 K/UL — SIGNIFICANT CHANGE UP (ref 1.2–3.4)
LYMPHOCYTES # BLD AUTO: 24.3 % — SIGNIFICANT CHANGE UP (ref 20.5–51.1)
MCHC RBC-ENTMCNC: 21.9 PG — LOW (ref 27–31)
MCHC RBC-ENTMCNC: 31.1 G/DL — LOW (ref 32–37)
MCV RBC AUTO: 70.3 FL — LOW (ref 80–94)
MONOCYTES # BLD AUTO: 0.9 K/UL — HIGH (ref 0.1–0.6)
MONOCYTES NFR BLD AUTO: 9.6 % — HIGH (ref 1.7–9.3)
NEUTROPHILS # BLD AUTO: 5.82 K/UL — SIGNIFICANT CHANGE UP (ref 1.4–6.5)
NEUTROPHILS NFR BLD AUTO: 62.2 % — SIGNIFICANT CHANGE UP (ref 42.2–75.2)
NRBC # BLD: 0 /100 WBCS — SIGNIFICANT CHANGE UP (ref 0–0)
PLATELET # BLD AUTO: 267 K/UL — SIGNIFICANT CHANGE UP (ref 130–400)
POTASSIUM SERPL-MCNC: 4.2 MMOL/L — SIGNIFICANT CHANGE UP (ref 3.5–5)
POTASSIUM SERPL-SCNC: 4.2 MMOL/L — SIGNIFICANT CHANGE UP (ref 3.5–5)
PROT SERPL-MCNC: 6.1 G/DL — SIGNIFICANT CHANGE UP (ref 6–8)
PROTHROM AB SERPL-ACNC: 14.7 SEC — HIGH (ref 9.95–12.87)
RBC # BLD: 4.89 M/UL — SIGNIFICANT CHANGE UP (ref 4.7–6.1)
RBC # FLD: 19.1 % — HIGH (ref 11.5–14.5)
SODIUM SERPL-SCNC: 139 MMOL/L — SIGNIFICANT CHANGE UP (ref 135–146)
WBC # BLD: 9.37 K/UL — SIGNIFICANT CHANGE UP (ref 4.8–10.8)
WBC # FLD AUTO: 9.37 K/UL — SIGNIFICANT CHANGE UP (ref 4.8–10.8)

## 2022-10-17 PROCEDURE — 93010 ELECTROCARDIOGRAM REPORT: CPT

## 2022-10-17 PROCEDURE — 71046 X-RAY EXAM CHEST 2 VIEWS: CPT | Mod: 26

## 2022-10-17 NOTE — H&P PST ADULT - NSICDXPASTMEDICALHX_GEN_ALL_CORE_FT
PAST MEDICAL HISTORY:  Afib     Anemia     Atrial flutter     CAD (coronary artery disease)     Hyperlipidemia     Hypertension     Obese

## 2022-10-17 NOTE — H&P PST ADULT - HISTORY OF PRESENT ILLNESS
73 y/o male presents to PAST in preparation for Afib ablation in EPS on 10/31/22    Pt states that he was dx with Afib in 5/25 by cardiologist in [place] during routine EKG. Pt had MANDY/Cardioversion in 5/22 and recent one in 8/22. Pt had repeat ekg that showed afib again. Pt now for above procedure due to recurring afib.   PATIENT CURRENTLY DENIES CHEST PAIN  SHORTNESS OF BREATH  PALPITATIONS,  DYSURIA, OR UPPER RESPIRATORY INFECTION IN PAST 2 WEEKS  EXERCISE  TOLERANCE  2 FLIGHT OF STAIRS  WITHOUT SHORTNESS OF BREATH  pt denies any covid s/s, covid(+) 7/22  pt advised self quarantine till day of procedure  Patient verbalized understanding of instructions and was given the opportunity to ask questions and have them answered.  As per patient, this is their complete medical and surgical history, including medications both prescribed or over the counter.  written and verbal instructions with teach back on chlorhexidine shampoo provided,  pt verbalized understanding with returned demonstration    Anesthesia Alert  NO--Difficult Airway  NO--History of neck surgery or radiation  NO--Limited ROM of neck  NO--History of Malignant hyperthermia  NO--Personal or family history of Pseudocholinesterase deficiency.  NO--Prior Anesthesia Complication  NO--Latex Allergy  NO--Loose teeth  NO--History of Rheumatoid Arthritis  Yes--EVON has cpap  NO--Bleeding risk on Eliquis   NO--Other_____    I48.0 / CPT 82891 / 30094 / 67829 / 95278 / 61530 / 69794    Paroxysmal atrial fibrillation    Encounter for other preprocedural examination    ^I48.0 / CPT 70461 / 48636 / 56580 / 75065 / 82224 / 36376    Paroxysmal atrial fibrillation    Encounter for other preprocedural examination    Hypertension    CAD (coronary artery disease)    Atrial flutter    Anemia    Hyperlipidemia    S/P CABG (coronary artery bypass graft)    H/O total knee replacement, left

## 2022-10-17 NOTE — H&P PST ADULT - NSICDXPASTSURGICALHX_GEN_ALL_CORE_FT
PAST SURGICAL HISTORY:  H/O Spinal surgery     H/O total knee replacement, left     History of cardiac cath x1 2015    S/P CABG (coronary artery bypass graft) 2005

## 2022-11-01 ENCOUNTER — INPATIENT (INPATIENT)
Facility: HOSPITAL | Age: 72
LOS: 0 days | Discharge: HOME | End: 2022-11-02
Attending: INTERNAL MEDICINE | Admitting: INTERNAL MEDICINE

## 2022-11-01 ENCOUNTER — TRANSCRIPTION ENCOUNTER (OUTPATIENT)
Age: 72
End: 2022-11-01

## 2022-11-01 VITALS
DIASTOLIC BLOOD PRESSURE: 66 MMHG | HEART RATE: 72 BPM | HEIGHT: 68.9 IN | RESPIRATION RATE: 16 BRPM | SYSTOLIC BLOOD PRESSURE: 142 MMHG | WEIGHT: 257.94 LBS | OXYGEN SATURATION: 95 %

## 2022-11-01 DIAGNOSIS — I48.0 PAROXYSMAL ATRIAL FIBRILLATION: ICD-10-CM

## 2022-11-01 DIAGNOSIS — Z96.652 PRESENCE OF LEFT ARTIFICIAL KNEE JOINT: Chronic | ICD-10-CM

## 2022-11-01 DIAGNOSIS — Z98.890 OTHER SPECIFIED POSTPROCEDURAL STATES: Chronic | ICD-10-CM

## 2022-11-01 DIAGNOSIS — Z95.1 PRESENCE OF AORTOCORONARY BYPASS GRAFT: Chronic | ICD-10-CM

## 2022-11-01 PROCEDURE — 93623 PRGRMD STIMJ&PACG IV RX NFS: CPT | Mod: 26

## 2022-11-01 PROCEDURE — 93656 COMPRE EP EVAL ABLTJ ATR FIB: CPT

## 2022-11-01 PROCEDURE — 93320 DOPPLER ECHO COMPLETE: CPT | Mod: 26

## 2022-11-01 PROCEDURE — 93655 ICAR CATH ABLTJ DSCRT ARRHYT: CPT

## 2022-11-01 PROCEDURE — 93325 DOPPLER ECHO COLOR FLOW MAPG: CPT | Mod: 26

## 2022-11-01 PROCEDURE — 99232 SBSQ HOSP IP/OBS MODERATE 35: CPT | Mod: 25

## 2022-11-01 PROCEDURE — 93312 ECHO TRANSESOPHAGEAL: CPT | Mod: 26

## 2022-11-01 RX ORDER — PANTOPRAZOLE SODIUM 20 MG/1
40 TABLET, DELAYED RELEASE ORAL
Refills: 0 | Status: DISCONTINUED | OUTPATIENT
Start: 2022-11-01 | End: 2022-11-02

## 2022-11-01 RX ORDER — OFLOXACIN 0.3 %
1 DROPS OPHTHALMIC (EYE) EVERY 6 HOURS
Refills: 0 | Status: COMPLETED | OUTPATIENT
Start: 2022-11-01 | End: 2022-11-02

## 2022-11-01 RX ORDER — CARVEDILOL PHOSPHATE 80 MG/1
25 CAPSULE, EXTENDED RELEASE ORAL EVERY 12 HOURS
Refills: 0 | Status: DISCONTINUED | OUTPATIENT
Start: 2022-11-01 | End: 2022-11-02

## 2022-11-01 RX ORDER — ATORVASTATIN CALCIUM 80 MG/1
20 TABLET, FILM COATED ORAL AT BEDTIME
Refills: 0 | Status: DISCONTINUED | OUTPATIENT
Start: 2022-11-01 | End: 2022-11-02

## 2022-11-01 RX ORDER — PANTOPRAZOLE SODIUM 20 MG/1
40 TABLET, DELAYED RELEASE ORAL ONCE
Refills: 0 | Status: COMPLETED | OUTPATIENT
Start: 2022-11-01 | End: 2022-11-01

## 2022-11-01 RX ORDER — VALSARTAN 80 MG/1
320 TABLET ORAL DAILY
Refills: 0 | Status: DISCONTINUED | OUTPATIENT
Start: 2022-11-01 | End: 2022-11-02

## 2022-11-01 RX ORDER — ACETAMINOPHEN 500 MG
650 TABLET ORAL EVERY 6 HOURS
Refills: 0 | Status: DISCONTINUED | OUTPATIENT
Start: 2022-11-01 | End: 2022-11-02

## 2022-11-01 RX ORDER — AMLODIPINE BESYLATE 2.5 MG/1
10 TABLET ORAL DAILY
Refills: 0 | Status: DISCONTINUED | OUTPATIENT
Start: 2022-11-01 | End: 2022-11-02

## 2022-11-01 RX ORDER — HYDROCHLOROTHIAZIDE 25 MG
25 TABLET ORAL DAILY
Refills: 0 | Status: DISCONTINUED | OUTPATIENT
Start: 2022-11-01 | End: 2022-11-02

## 2022-11-01 RX ORDER — APIXABAN 2.5 MG/1
5 TABLET, FILM COATED ORAL EVERY 12 HOURS
Refills: 0 | Status: DISCONTINUED | OUTPATIENT
Start: 2022-11-01 | End: 2022-11-02

## 2022-11-01 RX ADMIN — Medication 1 DROP(S): at 23:06

## 2022-11-01 RX ADMIN — Medication 2 DROP(S): at 21:15

## 2022-11-01 RX ADMIN — PANTOPRAZOLE SODIUM 40 MILLIGRAM(S): 20 TABLET, DELAYED RELEASE ORAL at 22:54

## 2022-11-01 RX ADMIN — Medication 2 DROP(S): at 19:16

## 2022-11-01 RX ADMIN — Medication 2 DROP(S): at 18:12

## 2022-11-01 RX ADMIN — Medication 2 DROP(S): at 22:15

## 2022-11-01 RX ADMIN — Medication 1 DROP(S): at 18:00

## 2022-11-01 RX ADMIN — ATORVASTATIN CALCIUM 20 MILLIGRAM(S): 80 TABLET, FILM COATED ORAL at 21:17

## 2022-11-01 RX ADMIN — Medication 2 DROP(S): at 17:12

## 2022-11-01 RX ADMIN — Medication 2 DROP(S): at 20:15

## 2022-11-01 RX ADMIN — Medication 650 MILLIGRAM(S): at 18:30

## 2022-11-01 RX ADMIN — APIXABAN 5 MILLIGRAM(S): 2.5 TABLET, FILM COATED ORAL at 17:09

## 2022-11-01 RX ADMIN — PANTOPRAZOLE SODIUM 40 MILLIGRAM(S): 20 TABLET, DELAYED RELEASE ORAL at 23:07

## 2022-11-01 RX ADMIN — CARVEDILOL PHOSPHATE 25 MILLIGRAM(S): 80 CAPSULE, EXTENDED RELEASE ORAL at 17:09

## 2022-11-01 RX ADMIN — Medication 2 DROP(S): at 23:15

## 2022-11-01 RX ADMIN — Medication 650 MILLIGRAM(S): at 17:59

## 2022-11-01 NOTE — PACU DISCHARGE NOTE - NSPTMEETSDISCHCRITERIADT_GEN_A_CORE
Assessment/Plan: 





Assessment/Plan: 


POD#6 67F s/p [id number] with L12 TLIF and L3 PSO. >1L blood loss 

intraprocedure.  





may transfer to floor today, dependent on repeat H:H 


continue to follow H&H while drains productive, H:H this am pending


PT/OT -continue to increase activity.  


Aggressive bowel protocol, miralax TID  


JPX1, continue JESS this am.


DVT ppx, WILDA's, SCD's, IVC placed preoperatively, Lovenox 


Dispo-Likely Rehab for deconditioning.  





Subjective: 


Some right leg pain but tolerable. Getting OOB 


Objective: 





NAD


VSS, continues with mild tachycardia.  


AAOx4


MAEx4, 5/5=


incision dressed,


Catheter Insertion Date: 06/08/18





- Physician


Discussed Patient with : Tiffani





Neurosurgery Physical Exam





- Vitals, I&O, Labs





 I and O











 06/13/18 06/14/18 06/15/18





 05:59 05:59 05:59


 


Intake Total 1500 2627 


 


Output Total 50 130 


 


Balance 1450 2497 


 


Intake:   


 


  Oral (ml) 400 350 


 


  IV Intake (ml)  500 


 


  IV Infused (ml) 1100 1177 


 


    Albumin 5% 500 ml @ As  500 





    Directed IV ONCE ONE Rx#:   





    [id number]   


 


    NS W/ 20 KCl/L 1,000 ml @ 1100 677 





    100 mls/hr IV CONT KALANI   





    Rx#:[id number]   


 


  Packed Red Blood Cells (  600 





  ml)   


 


Output:   


 


  JESS Drain Output (ml) 50 130 


 


    Right Posterior 50 130 


 


Other:   


 


  Number of Voids   


 


    Toilet 1 2 


 


  Number of Stools   


 


    Toilet  0 








 Vital Signs











Temp Pulse Resp BP Pulse Ox


 


 36.4 C   83   18   134/77 H  93 


 


 06/13/18 19:48  06/14/18 04:00  06/14/18 04:00  06/14/18 04:00  06/14/18 04:00








 Laboratory Results





 06/13/18 17:40 





 06/11/18 09:00 











ICD10 Worksheet


Patient Problems: 


 Problems











Problem Status Onset


 


Bacteremia Acute 01-Nov-2022 15:14

## 2022-11-01 NOTE — DISCHARGE NOTE PROVIDER - ATTENDING DISCHARGE PHYSICAL EXAMINATION:
Physical Exam  T(C): 36.3 (11-02-22 @ 07:49), Max: 36.7 (11-02-22 @ 04:34)  HR: 79 (11-02-22 @ 07:49) (73 - 83)  BP: 124/66 (11-02-22 @ 07:49) (118/56 - 138/72)  RR: 18 (11-02-22 @ 07:49) (18 - 18)  SpO2: 98% (11-02-22 @ 07:49) (92% - 98%)  Gen: NAD  CV: RRR, nl S1 and S2, no m/r/g, no LE edema, no JVD  Pulm: CTAB, no crackles  GI: soft, nontender  MSK: normal ROM  Extremities: warm  Neuro: A+Ox3  Psych: cooperative

## 2022-11-01 NOTE — PATIENT PROFILE ADULT - NSPROPTRIGHTNOTIFY_GEN_A_NUR
Dr. Christianson:  I have personally performed a face to face bedside history and physical examination of this patient. I have discussed the history, examination, review of systems, assessment and plan of management with the resident. I have reviewed the electronic medical record and amended it to reflect my history, review of systems, physical exam, assessment and plan.    76M h/o HTN, DM, ESRD on T/Th/Sa (last HD yest), presents with 1 week of decreased PO and vomiting after eating.  Unable to walk x 2mo, facial swelling and left sided swelling.   today at home, took Clonidine prior to coming to ED.  Endorses weight loss over past several months.    Exam:  - no acute distress  - naima, regular  - +facial plethora  - abd soft, non-tender  - +mild swelling to left hand  - no pedal edema    A/P  - vomiting x 1 week, consider malignancy/obstruction  - basic labs  - ekg  - CT abd/pelvis
declines

## 2022-11-01 NOTE — PRE-ANESTHESIA EVALUATION ADULT - NSPREOPDXFT_GEN_ALL_CORE
A FIB Terbinafine Counseling: Patient counseling regarding adverse effects of terbinafine including but not limited to headache, diarrhea, rash, upset stomach, liver function test abnormalities, itching, taste/smell disturbance, nausea, abdominal pain, and flatulence.  There is a rare possibility of liver failure that can occur when taking terbinafine.  The patient understands that a baseline LFT and kidney function test may be required. The patient verbalized understanding of the proper use and possible adverse effects of terbinafine.  All of the patient's questions and concerns were addressed.

## 2022-11-01 NOTE — PATIENT PROFILE ADULT - FUNCTIONAL ASSESSMENT - BASIC MOBILITY 6.
3-calculated by average/Not able to assess (calculate score using [facility] averaging method)

## 2022-11-01 NOTE — DISCHARGE NOTE PROVIDER - HOSPITAL COURSE
Patient is a 72 year old male with PMH CAD, CABG in 2005 (LIMA to LAD, sequential SVGs to D1 and OM2 from LIMA), LHC in 2014: patent grafts and RCA, subtotal stenosis of OM1 (stented successfully with UZMA), Afib s/p successful DCCV on Eliquis, s/p spine surgery presents to [facility] for Afib ablation. Patient underwent successful afib ablation with Dr. Lozano on 11/1/2022 without complications, bilateral groin sites clean dry and intact no hematoma present. Patient monitored on telemetry overnight, remains in sinus rhythm without any events. Patient hemodynamically stable for discharge home and will followup with Dr. Lozano in 1 month. Patient is a 72 year old male with PMH CAD, CABG in 2005 (LIMA to LAD, sequential SVGs to D1 and OM2 from LIMA), LHC in 2014: patent grafts and RCA, subtotal stenosis of OM1 (stented successfully with UZMA), Afib s/p successful DCCV on Eliquis, s/p spine surgery presents to [facility] for Afib ablation. Patient underwent successful afib ablation with Dr. Lozano on 11/1/2022 without complications, bilateral groin sites clean dry and intact no hematoma present. Patient monitored on telemetry overnight, remains in sinus rhythm without any events. Patient hemodynamically stable for discharge home and will followup with Dr. Lozano in 1 month.

## 2022-11-01 NOTE — DISCHARGE NOTE PROVIDER - CARE PROVIDER_API CALL
Silas Lozano; AMADEO)  [place] [address]  Phone: [phone]  Fax: [phone]  Follow Up Time: 1 month

## 2022-11-01 NOTE — DISCHARGE NOTE PROVIDER - NSDCCPTREATMENT_GEN_ALL_CORE_FT
PRINCIPAL PROCEDURE  Procedure: Atrial ablation  Findings and Treatment: You underwent Afib ablation on 11/1/2022 with Dr. Lozano without complications. Bilateral groin sites are clean dry and intact without any hematoma.   - Please followup with Dr. Lozano in 1 month.   - Please start taking pantoprazole 40 mg daily.  - Continue taking your Eliquis 5mg daily.   - You may shower today.  - Do not drive or operate heavy machinery for 3 days.  - Do not submerge in water (example: baths, swimming) for 1 week.  - No squatting, exertional activities, or lifting anything > 10 lbs for 1 week.  - Any sudden swelling, redness, fever, discharge, or severe pain, call the Electrophysiology Office at [phone].       PRINCIPAL PROCEDURE  Procedure: Atrial ablation  Findings and Treatment: You underwent Afib ablation on 11/1/2022 with Dr. Lozano without complications. Bilateral groin sites are clean dry and intact without any hematoma.   - Please followup with Dr. Lozano in 1 month.   - Please start taking pantoprazole 40 mg daily.  - Continue taking your Eliquis 5mg daily.   - You may shower today.  - Do not drive or operate heavy machinery for 3 days.  - Do not submerge in water (example: baths, swimming) for 1 week.  - No squatting, exertional activities, or lifting anything > 10 lbs for 1 week.  - Any sudden swelling, redness, fever, discharge, or severe pain, call the Electrophysiology Office at [phone].  - continue Eliquis   - protonix 40 mg daily x 30 days  - No heavy lifting x 1 week  -  no driving for 2 days  - Pt may shower today  - No riding bike for 1 week  - Follow up with EP when he returns from [place] in December  - ok to discharge home       PRINCIPAL PROCEDURE  Procedure: Atrial ablation  Findings and Treatment: You underwent Afib ablation on 11/1/2022 with Dr. Lozano without complications. Bilateral groin sites are clean dry and intact without any hematoma.   - No squatting, exertional activities, or lifting anything > 10 lbs for 1 week.  - Any sudden swelling, redness, fever, discharge, or severe pain, call the Electrophysiology Office at [phone].  - continue Eliquis   - protonix 40 mg daily x 30 days  - No heavy lifting x 1 week  -  no driving for 2 days  - Pt may shower today  - No riding bike for 1 week  - Follow up with EP when he returns from [place] in December  - ok to discharge home

## 2022-11-01 NOTE — DISCHARGE NOTE PROVIDER - NSDCFUSCHEDAPPT_GEN_ALL_CORE_FT
Silas Lozano  [facility] Physician [facility]  CARDIOLOGY [address]  Scheduled Appointment: 12/16/2022     no

## 2022-11-01 NOTE — PATIENT PROFILE ADULT - FALL HARM RISK - UNIVERSAL INTERVENTIONS
Bed in lowest position, wheels locked, appropriate side rails in place/Call bell, personal items and telephone in reach/Instruct patient to call for assistance before getting out of bed or chair/Non-slip footwear when patient is out of bed/[place] to call system/Physically safe environment - no spills, clutter or unnecessary equipment/Purposeful Proactive Rounding/Room/bathroom lighting operational, light cord in reach

## 2022-11-02 ENCOUNTER — TRANSCRIPTION ENCOUNTER (OUTPATIENT)
Age: 72
End: 2022-11-02

## 2022-11-02 VITALS
HEART RATE: 79 BPM | SYSTOLIC BLOOD PRESSURE: 124 MMHG | TEMPERATURE: 97 F | RESPIRATION RATE: 18 BRPM | DIASTOLIC BLOOD PRESSURE: 66 MMHG | OXYGEN SATURATION: 98 %

## 2022-11-02 PROBLEM — I48.91 UNSPECIFIED ATRIAL FIBRILLATION: Chronic | Status: ACTIVE | Noted: 2022-10-17

## 2022-11-02 PROBLEM — E66.9 OBESITY, UNSPECIFIED: Chronic | Status: ACTIVE | Noted: 2022-10-17

## 2022-11-02 PROCEDURE — 99239 HOSP IP/OBS DSCHRG MGMT >30: CPT

## 2022-11-02 RX ORDER — PANTOPRAZOLE SODIUM 20 MG/1
1 TABLET, DELAYED RELEASE ORAL
Qty: 30 | Refills: 0
Start: 2022-11-02 | End: 2022-12-01

## 2022-11-02 RX ADMIN — CARVEDILOL PHOSPHATE 25 MILLIGRAM(S): 80 CAPSULE, EXTENDED RELEASE ORAL at 06:05

## 2022-11-02 RX ADMIN — APIXABAN 5 MILLIGRAM(S): 2.5 TABLET, FILM COATED ORAL at 06:04

## 2022-11-02 RX ADMIN — AMLODIPINE BESYLATE 10 MILLIGRAM(S): 2.5 TABLET ORAL at 06:05

## 2022-11-02 RX ADMIN — Medication 25 MILLIGRAM(S): at 06:04

## 2022-11-02 RX ADMIN — Medication 1 DROP(S): at 11:15

## 2022-11-02 RX ADMIN — VALSARTAN 320 MILLIGRAM(S): 80 TABLET ORAL at 06:04

## 2022-11-02 RX ADMIN — Medication 650 MILLIGRAM(S): at 00:00

## 2022-11-02 RX ADMIN — Medication 1 DROP(S): at 06:06

## 2022-11-02 RX ADMIN — PANTOPRAZOLE SODIUM 40 MILLIGRAM(S): 20 TABLET, DELAYED RELEASE ORAL at 06:05

## 2022-11-02 NOTE — PROGRESS NOTE ADULT - ASSESSMENT
A/P  Patient S/P AFib  Ablation  - continue Eliquis   - protonix 40 mg daily x 30 days  - No heavy lifting x 1 week  -  no driving for 2 days  - Pt may shower today  - No riding bike for 1 week  - Follow up with EP when he returns from [place] in December  - ok to discharge home

## 2022-11-02 NOTE — PROGRESS NOTE ADULT - SUBJECTIVE AND OBJECTIVE BOX
Electrophysiology Brief Post-Op Note      I have personally seen and examined the patient.  I agree with the history and physical which I have reviewed and noted any changes below.  11-01-22 @ 07:40    PRE-OP DIAGNOSIS: AF    POST-OP DIAGNOSIS: AF    PROCEDURE: AF ablation    Vendor Representative was present for clinical support.    Physician: Blake  Assistant: None    ANESTHESIA TYPE:  [ X ]General Anesthesia  [  ] Sedation  [ X ] Local/Regional    ESTIMATED BLOOD LOSS:   40    mL    CONDITION  [  ] Critical  [  ] Serious  [  ]Fair  [ X ]Good      SPECIMENS REMOVED (IF APPLICABLE):  none    IMPLANTS (IF APPLICABLE)  none    FINDINGS: none    COMPLICATIONS: none     PLAN OF CARE  -	Start Eliquis 5 mg q12 tonight at 10 pm  -	Start protonix 40 mg daily tonight  -	Bed rest till am  -	Admit to telemetry                    
INTERVAL HPI/OVERNIGHT EVENTS:    Patient s/p afib ablation.   No events over night  Patient is in NSR    MEDICATIONS  (STANDING):  amLODIPine   Tablet 10 milliGRAM(s) Oral daily  apixaban 5 milliGRAM(s) Oral every 12 hours  atorvastatin 20 milliGRAM(s) Oral at bedtime  carvedilol 25 milliGRAM(s) Oral every 12 hours  hydrochlorothiazide 25 milliGRAM(s) Oral daily  ofloxacin 0.3% Solution 1 Drop(s) Right EYE every 6 hours  pantoprazole    Tablet 40 milliGRAM(s) Oral before breakfast  tetracaine 0.5% Solution 2 Drop(s) Right EYE <User Schedule>  valsartan 320 milliGRAM(s) Oral daily    MEDICATIONS  (PRN):  acetaminophen     Tablet .. 650 milliGRAM(s) Oral every 6 hours PRN Moderate Pain (4 - 6)    Allergies    No Known Allergies    Intolerances      Vital Signs Last 24 Hrs  T(C): 36.3 (02 Nov 2022 07:49), Max: 36.7 (02 Nov 2022 04:34)  T(F): 97.4 (02 Nov 2022 07:49), Max: 98 (02 Nov 2022 04:34)  HR: 79 (02 Nov 2022 07:49) (72 - 83)  BP: 124/66 (02 Nov 2022 07:49) (118/56 - 142/66)  BP(mean): 90 (02 Nov 2022 07:49) (80 - 99)  RR: 18 (02 Nov 2022 07:49) (16 - 18)  SpO2: 98% (02 Nov 2022 07:49) (92% - 98%)    Parameters below as of 02 Nov 2022 07:49  Patient On (Oxygen Delivery Method): room air      HEENT HERNANDO EOMI  Lung CTAB  Heart RRR normal S1 S2  abd +BS soft  groins No hematoma, no bleeding  Ext no C/C/E

## 2022-11-02 NOTE — DISCHARGE NOTE NURSING/CASE MANAGEMENT/SOCIAL WORK - PATIENT PORTAL LINK FT
You can access the FollowMyHealth Patient Portal offered by [facility] by registering at the following website: [email or web address]. By joining [employer]’s FollowMyHealth portal, you will also be able to view your health information using other applications (apps) compatible with our system.

## 2022-11-02 NOTE — DISCHARGE NOTE NURSING/CASE MANAGEMENT/SOCIAL WORK - NSDCPEFALRISK_GEN_ALL_CORE
For information on Fall & Injury Prevention, visit: [email or web address] OR  [email or web address] OR  https://www.cdc.gov/steadi/patient.html

## 2022-11-10 DIAGNOSIS — I48.19 OTHER PERSISTENT ATRIAL FIBRILLATION: ICD-10-CM

## 2022-11-10 DIAGNOSIS — I48.3 TYPICAL ATRIAL FLUTTER: ICD-10-CM

## 2022-11-10 DIAGNOSIS — Z95.1 PRESENCE OF AORTOCORONARY BYPASS GRAFT: ICD-10-CM

## 2022-11-10 DIAGNOSIS — Z95.5 PRESENCE OF CORONARY ANGIOPLASTY IMPLANT AND GRAFT: ICD-10-CM

## 2022-11-10 DIAGNOSIS — Z79.01 LONG TERM (CURRENT) USE OF ANTICOAGULANTS: ICD-10-CM

## 2022-11-10 DIAGNOSIS — I25.10 ATHEROSCLEROTIC HEART DISEASE OF NATIVE CORONARY ARTERY WITHOUT ANGINA PECTORIS: ICD-10-CM

## 2022-11-13 ENCOUNTER — RX RENEWAL (OUTPATIENT)
Age: 72
End: 2022-11-13

## 2022-12-03 ENCOUNTER — RX RENEWAL (OUTPATIENT)
Age: 72
End: 2022-12-03

## 2022-12-23 ENCOUNTER — APPOINTMENT (OUTPATIENT)
Dept: CARDIOLOGY | Facility: CLINIC | Age: 72
End: 2022-12-23
Payer: MEDICARE

## 2022-12-23 VITALS
WEIGHT: 237 LBS | HEIGHT: 69 IN | BODY MASS INDEX: 35.1 KG/M2 | HEART RATE: 66 BPM | DIASTOLIC BLOOD PRESSURE: 71 MMHG | TEMPERATURE: 97.1 F | SYSTOLIC BLOOD PRESSURE: 127 MMHG

## 2022-12-23 PROCEDURE — 93000 ELECTROCARDIOGRAM COMPLETE: CPT

## 2022-12-23 PROCEDURE — 99214 OFFICE O/P EST MOD 30 MIN: CPT

## 2022-12-23 RX ORDER — VALSARTAN AND HYDROCHLOROTHIAZIDE 320; 12.5 MG/1; MG/1
320-12.5 TABLET, FILM COATED ORAL DAILY
Qty: 90 | Refills: 3 | Status: COMPLETED | COMMUNITY
Start: 2022-10-28 | End: 2022-12-23

## 2023-01-01 NOTE — ADDENDUM
[FreeTextEntry1] : IJina assisted in documentation on 12/26/2022   acting as a scribe for Dr. Silas Lozano.\par

## 2023-01-01 NOTE — HISTORY OF PRESENT ILLNESS
[FreeTextEntry1] : 72-yo male with h/o CAD, CABG in 2005 (LIMA to LAD, sequential SVGs to D1 and OM2 from LIMA). LHC in 2014: patent grafts and RCA, subtotal stenosis of OM1 (stented successfully with UZMA).\par \par Denies chest pain, HERNANDEZ or palpitations. BP has been well controlled.\par \par Was diagnosed with persistent A-fib in [place], s/p successful DCCV. Tolerates Eliquis well. S/p spine surgical procedure (Federica), still co back pain.\par \par Again recurnac of sympotatic AF and s/p DCCV. CHADVASC 3 Patient  come to the office for evaluation of his atrial fibrillation. They should feels much better after cardioversion has more energy and does not feel tired as he did when he is in atrial fibrillation.\par \par Patient s/p AF ablation on 11/01/2022. Comes for routine follow-up and is currently in normal sinus rhythm. \par \par \par \par \par EKG (12/23/2022) sinus rhythm at 66  bpm.

## 2023-01-01 NOTE — ASSESSMENT
[FreeTextEntry1] : AF\par - CHADVACS 3; cont AC\par - Continue Eliquis 5 mg Q12, no bleeding.\par - Will continue to monitor. \par \par \par HTN \par - Patient's pressure is well-controlled. \par - Continue Coreg 25 mg Q12. \par - Continue Valsartan-Hydrochlorothiazide 320-25 mg QD.\par - Continue Amlodipine 10 mg QD.

## 2023-01-01 NOTE — PHYSICAL EXAM
[Well Developed] : well developed [Well Nourished] : well nourished [No Acute Distress] : no acute distress [Normal Conjunctiva] : normal conjunctiva [Normal Venous Pressure] : normal venous pressure [No Carotid Bruit] : no carotid bruit [No Rub] : no rub [S4] : S4 [Clear Lung Fields] : clear lung fields [Good Air Entry] : good air entry [No Respiratory Distress] : no respiratory distress  [Soft] : abdomen soft [Non Tender] : non-tender [Normal Bowel Sounds] : normal bowel sounds [Normal Gait] : normal gait [No Cyanosis] : no cyanosis [No Clubbing] : no clubbing [No Varicosities] : no varicosities [Edema ___] : edema [unfilled] [No Rash] : no rash [Moves all extremities] : moves all extremities [Normal Speech] : normal speech [Alert and Oriented] : alert and oriented [Normal memory] : normal memory [Normal S1, S2] : normal S1, S2 [No Murmur] : no murmur [No Gallop] : no gallop [No Masses/organomegaly] : no masses/organomegaly [No Edema] : no edema [No Skin Lesions] : no skin lesions [No Focal Deficits] : no focal deficits [de-identified] : irregular S1 and S2

## 2023-01-01 NOTE — CARDIOLOGY SUMMARY
[de-identified] : 08/09/2022: \par Atypical A-flutter, VR 62/min, nonspecific T wave changes [de-identified] : Adenosine MPI 09/17/21:\par No ischemia. [de-identified] : 09/17/20:\par LVEF 63%, G2DD\par Moderate LAE\par Mild MR.

## 2023-01-01 NOTE — REVIEW OF SYSTEMS
[Lower Ext Edema] : lower extremity edema [Negative] : Heme/Lymph [Anxiety] : no anxiety [Easy Bleeding] : no tendency for easy bleeding [Easy Bruising] : no tendency for easy bruising [FreeTextEntry9] : Back pain

## 2023-05-26 ENCOUNTER — APPOINTMENT (OUTPATIENT)
Dept: ELECTROPHYSIOLOGY | Facility: CLINIC | Age: 73
End: 2023-05-26
Payer: MEDICARE

## 2023-05-26 VITALS
BODY MASS INDEX: 35.55 KG/M2 | WEIGHT: 240 LBS | DIASTOLIC BLOOD PRESSURE: 80 MMHG | TEMPERATURE: 98 F | HEART RATE: 73 BPM | HEIGHT: 69 IN | SYSTOLIC BLOOD PRESSURE: 124 MMHG

## 2023-05-26 PROCEDURE — 99214 OFFICE O/P EST MOD 30 MIN: CPT

## 2023-05-26 PROCEDURE — 93000 ELECTROCARDIOGRAM COMPLETE: CPT

## 2023-06-01 LAB
ALBUMIN SERPL ELPH-MCNC: 3.6 G/DL
ALP BLD-CCNC: 85 U/L
ALT SERPL-CCNC: 12 U/L
ANION GAP SERPL CALC-SCNC: 14 MMOL/L
AST SERPL-CCNC: 14 U/L
BILIRUB SERPL-MCNC: 0.7 MG/DL
BUN SERPL-MCNC: 15 MG/DL
CALCIUM SERPL-MCNC: 8.5 MG/DL
CHLORIDE SERPL-SCNC: 100 MMOL/L
CHOLEST SERPL-MCNC: 78 MG/DL
CO2 SERPL-SCNC: 21 MMOL/L
CREAT SERPL-MCNC: 1.1 MG/DL
EGFR: 71 ML/MIN/1.73M2
ESTIMATED AVERAGE GLUCOSE: 128 MG/DL
GLUCOSE SERPL-MCNC: 129 MG/DL
HBA1C MFR BLD HPLC: 6.1 %
HDLC SERPL-MCNC: 34 MG/DL
INR PPP: 2.04 RATIO
LDLC SERPL CALC-MCNC: 24 MG/DL
NONHDLC SERPL-MCNC: 44 MG/DL
POTASSIUM SERPL-SCNC: 4 MMOL/L
PROT SERPL-MCNC: 5.9 G/DL
PT BLD: 23.8 SEC
SODIUM SERPL-SCNC: 135 MMOL/L
TRIGL SERPL-MCNC: 99 MG/DL
TSH SERPL-ACNC: 2.14 UIU/ML

## 2023-06-03 NOTE — HISTORY OF PRESENT ILLNESS
[FreeTextEntry1] : 72-yo male with h/o CAD, CABG in 2005 (LIMA to LAD, sequential SVGs to D1 and OM2 from LIMA). LHC in 2014: patent grafts and RCA, subtotal stenosis of OM1 (stented successfully with UZMA).\par \par Denies chest pain, HERNANDEZ or palpitations. BP has been well controlled.\par \par Was diagnosed with persistent A-fib in [place], s/p successful DCCV. Tolerates Eliquis well. S/p spine surgical procedure (Federica), still co back pain.\par \par Again recurnac of symptomatic AF and s/p DCCV. CHADVASC 3 Patient  come to the office for evaluation of his atrial fibrillation. They should feels much better after cardioversion has more energy and does not feel tired as he did when he is in atrial fibrillation.\par \par Patient s/p AF ablation on 11/01/2022. Comes for routine follow-up and is currently in normal sinus rhythm. \par \par Patient comes to the office for routine follow-up. Patient found to be back in atrial fibrillation after last year's ablation.\par \par \par EKG (12/23/2022) sinus rhythm at 66  bpm.

## 2023-06-03 NOTE — PHYSICAL EXAM
[Well Developed] : well developed [Well Nourished] : well nourished [No Acute Distress] : no acute distress [Normal Conjunctiva] : normal conjunctiva [Normal Venous Pressure] : normal venous pressure [No Carotid Bruit] : no carotid bruit [Normal S1, S2] : normal S1, S2 [No Murmur] : no murmur [No Rub] : no rub [No Gallop] : no gallop [S4] : S4 [Clear Lung Fields] : clear lung fields [Good Air Entry] : good air entry [No Respiratory Distress] : no respiratory distress  [Soft] : abdomen soft [Non Tender] : non-tender [No Masses/organomegaly] : no masses/organomegaly [Normal Bowel Sounds] : normal bowel sounds [Normal Gait] : normal gait [No Edema] : no edema [No Cyanosis] : no cyanosis [No Clubbing] : no clubbing [No Varicosities] : no varicosities [Edema ___] : edema [unfilled] [No Rash] : no rash [No Skin Lesions] : no skin lesions [Moves all extremities] : moves all extremities [No Focal Deficits] : no focal deficits [Normal Speech] : normal speech [Alert and Oriented] : alert and oriented [Normal memory] : normal memory [de-identified] : irregular S1 and S2

## 2023-06-03 NOTE — ASSESSMENT
[FreeTextEntry1] : AF\par - CHADVACS 3; cont AC\par - Continue Eliquis 5 mg Q12, no bleeding.\par - Recommend MANDY cardioversion.\par \par \par HTN \par - Patient's pressure is well-controlled. \par - Continue Coreg 25 mg Q12. \par - Continue Valsartan-Hydrochlorothiazide 320-25 mg QD.\par - Continue Amlodipine 10 mg QD.

## 2023-06-03 NOTE — CARDIOLOGY SUMMARY
[de-identified] : 09/17/20:\par LVEF 63%, G2DD\par Moderate LAE\par Mild MR. [de-identified] : 08/09/2022: \par Atypical A-flutter, VR 62/min, nonspecific T wave changes [de-identified] : Adenosine MPI 09/17/21:\par No ischemia.

## 2023-06-03 NOTE — ADDENDUM
[FreeTextEntry1] : Jina SUE assisted in documentation on 05/26/2023   acting as a scribe for Dr. Silas Lozano.\par \par

## 2023-06-07 ENCOUNTER — OUTPATIENT (OUTPATIENT)
Dept: INPATIENT UNIT | Facility: HOSPITAL | Age: 73
LOS: 1 days | Discharge: HOME | End: 2023-06-07
Payer: MEDICARE

## 2023-06-07 DIAGNOSIS — I48.0 PAROXYSMAL ATRIAL FIBRILLATION: ICD-10-CM

## 2023-06-07 DIAGNOSIS — Z96.652 PRESENCE OF LEFT ARTIFICIAL KNEE JOINT: Chronic | ICD-10-CM

## 2023-06-07 DIAGNOSIS — Z95.1 PRESENCE OF AORTOCORONARY BYPASS GRAFT: Chronic | ICD-10-CM

## 2023-06-07 DIAGNOSIS — Z98.890 OTHER SPECIFIED POSTPROCEDURAL STATES: Chronic | ICD-10-CM

## 2023-06-07 PROCEDURE — 93320 DOPPLER ECHO COMPLETE: CPT | Mod: 26

## 2023-06-07 PROCEDURE — 92960 CARDIOVERSION ELECTRIC EXT: CPT

## 2023-06-07 PROCEDURE — 93325 DOPPLER ECHO COLOR FLOW MAPG: CPT | Mod: 26

## 2023-06-07 PROCEDURE — 93010 ELECTROCARDIOGRAM REPORT: CPT | Mod: 76

## 2023-06-07 PROCEDURE — 93005 ELECTROCARDIOGRAM TRACING: CPT | Mod: XU

## 2023-06-07 PROCEDURE — 93312 ECHO TRANSESOPHAGEAL: CPT

## 2023-06-07 PROCEDURE — 93325 DOPPLER ECHO COLOR FLOW MAPG: CPT

## 2023-06-07 PROCEDURE — 93312 ECHO TRANSESOPHAGEAL: CPT | Mod: 26,XU

## 2023-06-07 PROCEDURE — 93320 DOPPLER ECHO COMPLETE: CPT

## 2023-06-07 NOTE — PROGRESS NOTE ADULT - SUBJECTIVE AND OBJECTIVE BOX
I have personally seen and examined the patient.  I agree with the history and physical which I have reviewed and noted any changes below   06-07-23 @ 13:17                                                      POST OPERATIVE PROCEDURAL DOCUMENTATION    PRE-OP DIAGNOSIS: A-fib    POST-OP DIAGNOSIS A-fib    PROCEDURE:     After risks, benefits and alternatives of the procedure were explained, consent was signded and placed in the medical record.  Procedural timeout was taken.  Sedation was administered by anesthesia.  MANDY probe inserted without complication and MANDY performed.  Patient tolerated the procedure well without complication.  Post-procedure vital signs were stable.    MANDY findings:  LV Normal LVEF  LA Dilated, no thrombus  RV Normal function  RA Normal size, no thrombus  MV Trace MR  AV Normal  TV Trace TR  IAS Iatrogenic ASD after the ablation  Atheroma Mild atheroma in the arch and descending aorta  See full report for findings.    Cardioversion attempted:  200J  (X) successful  ( ) unsuccessful  300J  ( ) successful  ( ) unsuccessful  360J  ( ) successful  ( ) unsuccessful    Post procedure rhythm: SR    Recommendations:  Continue all current medications.  Patient to follow up with referring cardiologist in near future.     ANESTHESIA TYPE  [  ] General Anesthesia  [ X ] Conscious Sedation  [  ] Local/Regional  [  ] Deep sedation    CONDITION  [  ] Critical  [  ] Serious  [  ] Fair  [ x ] Good

## 2023-06-07 NOTE — PRE-ANESTHESIA EVALUATION ADULT - NSRADCARDRESULTSFT_GEN_ALL_CORE
TTE 8/31/22  Summary:   1. Left ventricular ejection fraction, by visual estimation, is 60 to   65%.   2. Normal global left ventricular systolic function.   3. Moderately enlarged left atrium.   4. Normal right atrial size.   5. Mild mitral valve regurgitation.   6. No left atrial appendage thrombus and decreased left atrial appendage   velocities.   7. Successful cardioversion to normal sinus rhythm with synchronized   200J DCCV.

## 2023-06-07 NOTE — ASU PATIENT PROFILE, ADULT - MEDICATION ADMINISTRATION INFO, PROFILE
Spoke to pt and scheduled f/u with Dr Mcintyre to review EMU, EEG and MRI results 6/5. Pt inquired about epilepsy surgery and approved of me sharing phone # with previous surgical pts to discuss their experience. I have requested 3T mri results from [facility].    no concerns

## 2023-06-07 NOTE — ASU PATIENT PROFILE, ADULT - FALL HARM RISK - UNIVERSAL INTERVENTIONS
Bed in lowest position, wheels locked, appropriate side rails in place/Call bell, personal items and telephone in reach/Instruct patient to call for assistance before getting out of bed or chair/Non-slip footwear when patient is out of bed/[place] to call system/Physically safe environment - no spills, clutter or unnecessary equipment/Purposeful Proactive Rounding/Room/bathroom lighting operational, light cord in reach

## 2023-06-07 NOTE — H&P CARDIOLOGY - HISTORY OF PRESENT ILLNESS
HPI    73 year old man with Afib/flutter, HTN, CAD here for MANDY/DCCV.    REVIEW OF SYSTEMS:  CONSTITUTIONAL: No weakness, fevers or chills  EYES/ENT: No visual changes;  No vertigo or throat pain   NECK: No pain or stiffness  RESPIRATORY: No cough, wheezing, hemoptysis; SEE HPI  CARDIOVASCULAR: SEE HPI  GASTROINTESTINAL: No abdominal or epigastric pain. No nausea, vomiting, or hematemesis; No diarrhea or constipation. No melena or hematochezia.  GENITOURINARY: No dysuria, frequency or hematuria  NEUROLOGICAL: No numbness or weakness  SKIN: No itching, rashes      PHYSICAL EXAM:  T(C): --  HR: --  BP: --  RR: --  SpO2: --  GENERAL: NAD  HEAD:  Atraumatic, Normocephalic  EYES: conjunctiva and sclera clear  NECK: No JVD  CHEST/LUNG: Clear to auscultation bilaterally; No wheeze  HEART: irregular  ABDOMEN: Soft, Nontender, Nondistended; Bowel sounds present  EXTREMITIES:  2+ Peripheral Pulses, No clubbing, cyanosis, or edema  NEUROLOGY:  A&Ox3, appropriate  SKIN: No rashes or lesions

## 2023-06-14 DIAGNOSIS — I48.19 OTHER PERSISTENT ATRIAL FIBRILLATION: ICD-10-CM

## 2023-06-29 ENCOUNTER — APPOINTMENT (OUTPATIENT)
Dept: CARDIOLOGY | Facility: CLINIC | Age: 73
End: 2023-06-29
Payer: MEDICARE

## 2023-06-29 VITALS
BODY MASS INDEX: 35.25 KG/M2 | HEART RATE: 65 BPM | HEIGHT: 69 IN | DIASTOLIC BLOOD PRESSURE: 62 MMHG | WEIGHT: 238 LBS | SYSTOLIC BLOOD PRESSURE: 120 MMHG

## 2023-06-29 PROCEDURE — 93000 ELECTROCARDIOGRAM COMPLETE: CPT

## 2023-06-29 PROCEDURE — 99214 OFFICE O/P EST MOD 30 MIN: CPT

## 2023-06-29 NOTE — PHYSICAL EXAM
[Well Developed] : well developed [No Acute Distress] : no acute distress [Normal Conjunctiva] : normal conjunctiva [Normal Venous Pressure] : normal venous pressure [No Carotid Bruit] : no carotid bruit [Normal S1, S2] : normal S1, S2 [No Rub] : no rub [S4] : S4 [Clear Lung Fields] : clear lung fields [Good Air Entry] : good air entry [No Respiratory Distress] : no respiratory distress  [Soft] : abdomen soft [Non Tender] : non-tender [Normal Bowel Sounds] : normal bowel sounds [Normal Gait] : normal gait [No Cyanosis] : no cyanosis [No Clubbing] : no clubbing [No Varicosities] : no varicosities [Edema ___] : edema [unfilled] [No Rash] : no rash [Moves all extremities] : moves all extremities [Normal Speech] : normal speech [Alert and Oriented] : alert and oriented [Normal memory] : normal memory [Obese] : obese

## 2023-06-29 NOTE — HISTORY OF PRESENT ILLNESS
[FreeTextEntry1] : 72 y/o male with h/o CAD, CABG in 2005 (LIMA to LAD, sequential SVGs to D1 and OM2 from LIMA). LHC in 2014: patent grafts and RCA, subtotal stenosis of OM1 (stented successfully with UZMA). PAD post right patch atherectomy \par \par Was diagnosed with A-fib in [place], s/p successful DCCV. Tolerates Eliquis well. S/p spine surgical procedure (Federica), still co back pain.\par \par Now for f/u post ablation followed by repeat DCCE, now SR \par \par Denies chest pain, HERNANDEZ or palpitations. BP has been well controlled.\par \par 6/23 \par GFR 71 \par LDL 24 \par AIC 6.1.\par \par

## 2023-06-29 NOTE — REVIEW OF SYSTEMS
[Negative] : Neurological [Lower Ext Edema] : no extremity edema [Anxiety] : no anxiety [Easy Bleeding] : no tendency for easy bleeding [Easy Bruising] : no tendency for easy bruising [FreeTextEntry9] : Back pain

## 2023-06-29 NOTE — CARDIOLOGY SUMMARY
[de-identified] : 06/29/23: \par SR 65/min, PVCs,  nonspecific T wave changes [de-identified] : 09/08/22:\par LVEF 60%, G2DD\par Mod LAE\par Mild MR, TR.\par \par 09/17/20:\par LVEF 63%, G2DD\par Moderate LAE\par Mild MR. [de-identified] : Adenosine MPI 09/17/21:\par No ischemia.

## 2023-06-29 NOTE — ASSESSMENT
[FreeTextEntry1] : 74 y/o male with h/o CAD, s/p CABG. No angina, no evidence of ischemia.\par HTN controlled now.\par Persistent A-fib, A-flutter. S/p CV. Post ablation - back in afib post DCCE now in SR.\par Obesity.\par Hyperlipidemia.\par PAD post right patch atherectomy. Will obtain report.\par \par \par Plan:\par Continue treatment.\par Diet, weight loss discussed.\par Repeat blood work.\par F/u in 6 months.\par \par Mark Anthony Enciso MD\par

## 2023-06-30 ENCOUNTER — RESULT CHARGE (OUTPATIENT)
Age: 73
End: 2023-06-30

## 2023-08-24 ENCOUNTER — APPOINTMENT (OUTPATIENT)
Dept: CARDIOLOGY | Facility: CLINIC | Age: 73
End: 2023-08-24
Payer: MEDICARE

## 2023-08-24 VITALS
WEIGHT: 232 LBS | HEIGHT: 69 IN | SYSTOLIC BLOOD PRESSURE: 130 MMHG | BODY MASS INDEX: 34.36 KG/M2 | DIASTOLIC BLOOD PRESSURE: 74 MMHG | HEART RATE: 74 BPM

## 2023-08-24 PROCEDURE — 99214 OFFICE O/P EST MOD 30 MIN: CPT

## 2023-08-24 PROCEDURE — 93000 ELECTROCARDIOGRAM COMPLETE: CPT

## 2023-08-24 NOTE — REASON FOR VISIT
[CV Risk Factors and Non-Cardiac Disease] : CV risk factors and non-cardiac disease [Hyperlipidemia] : hyperlipidemia [Hypertension] : hypertension [Coronary Artery Disease] : coronary artery disease [Spouse] : spouse

## 2023-08-24 NOTE — ASSESSMENT
[FreeTextEntry1] : 72 y/o male with h/o CAD, s/p CABG. No angina, no evidence of ischemia. HTN controlled now. Persistent A-fib, A-flutter. S/p CV. Post ablation - back in afib. Obesity. Hyperlipidemia. PAD post right patch atherectomy. Will obtain report.  Plan: Will schedule MANDY/cardioversion. F/u with EP  Continue treatment. Diet, weight loss discussed. F/u after the procedure.  Mark Anthony Enciso MD

## 2023-08-24 NOTE — HISTORY OF PRESENT ILLNESS
[FreeTextEntry1] : 74 y/o male with h/o CAD, CABG in 2005 (LIMA to LAD, sequential SVGs to D1 and OM2 from LIMA). LHC in 2014: patent grafts and RCA, subtotal stenosis of OM1 (stented successfully with UZMA). PAD post right patch atherectomy   S/p A-fib ablation in Nov 2022.   Pt is here for f.u. Pt  reports being in Afib in the past 3 days.  Pt established care Dr Siva Patel in [place] due to proximity of his residency.   Pt had Nuclear Stress test that was ordered by Dr Patel which he reports is WNL.  Pt was started on Multaq which he couldnt tolerate due   to multiple side affects such as nausea, fatigue, SOB.   Denies chest pain, Pt c.o of SOB due to Afib and increased fatigue, denies palpitations. BP has been well controlled.

## 2023-08-24 NOTE — CARDIOLOGY SUMMARY
[de-identified] : 08/24/23:  A-fib, VR 74/min, nonspecific T wave changes. [de-identified] : 09/08/22:\par  LVEF 60%, G2DD\par  Mod LAE\par  Mild MR, TR.\par  \par  09/17/20:\par  LVEF 63%, G2DD\par  Moderate LAE\par  Mild MR. [de-identified] : Adenosine MPI 09/17/21:\par  No ischemia.

## 2023-08-25 LAB
ANION GAP SERPL CALC-SCNC: 14 MMOL/L
BUN SERPL-MCNC: 16 MG/DL
CALCIUM SERPL-MCNC: 9 MG/DL
CHLORIDE SERPL-SCNC: 102 MMOL/L
CO2 SERPL-SCNC: 20 MMOL/L
CREAT SERPL-MCNC: 1.1 MG/DL
EGFR: 71 ML/MIN/1.73M2
GLUCOSE SERPL-MCNC: 103 MG/DL
HCT VFR BLD CALC: 33.4 %
HGB BLD-MCNC: 9.1 G/DL
INR PPP: 2.03 RATIO
MCHC RBC-ENTMCNC: 17.9 PG
MCHC RBC-ENTMCNC: 27.2 G/DL
MCV RBC AUTO: 65.6 FL
PLATELET # BLD AUTO: 323 K/UL
PMV BLD: 9.5 FL
POTASSIUM SERPL-SCNC: 4.6 MMOL/L
PT BLD: 23.6 SEC
RBC # BLD: 5.09 M/UL
RBC # FLD: 19.9 %
SODIUM SERPL-SCNC: 136 MMOL/L
WBC # FLD AUTO: 7.82 K/UL

## 2023-08-30 ENCOUNTER — OUTPATIENT (OUTPATIENT)
Dept: OUTPATIENT SERVICES | Facility: HOSPITAL | Age: 73
LOS: 1 days | Discharge: HOME | End: 2023-08-30
Payer: MEDICARE

## 2023-08-30 VITALS — WEIGHT: 235.89 LBS

## 2023-08-30 DIAGNOSIS — Z98.890 OTHER SPECIFIED POSTPROCEDURAL STATES: Chronic | ICD-10-CM

## 2023-08-30 DIAGNOSIS — I48.91 UNSPECIFIED ATRIAL FIBRILLATION: ICD-10-CM

## 2023-08-30 DIAGNOSIS — Z95.1 PRESENCE OF AORTOCORONARY BYPASS GRAFT: Chronic | ICD-10-CM

## 2023-08-30 DIAGNOSIS — Z96.652 PRESENCE OF LEFT ARTIFICIAL KNEE JOINT: Chronic | ICD-10-CM

## 2023-08-30 PROCEDURE — 93312 ECHO TRANSESOPHAGEAL: CPT

## 2023-08-30 PROCEDURE — 93325 DOPPLER ECHO COLOR FLOW MAPG: CPT

## 2023-08-30 PROCEDURE — 93325 DOPPLER ECHO COLOR FLOW MAPG: CPT | Mod: 26

## 2023-08-30 PROCEDURE — 92960 CARDIOVERSION ELECTRIC EXT: CPT

## 2023-08-30 PROCEDURE — 93320 DOPPLER ECHO COMPLETE: CPT

## 2023-08-30 PROCEDURE — 93312 ECHO TRANSESOPHAGEAL: CPT | Mod: 26

## 2023-08-30 PROCEDURE — 93320 DOPPLER ECHO COMPLETE: CPT | Mod: 26

## 2023-08-30 RX ORDER — ATORVASTATIN CALCIUM 80 MG/1
1 TABLET, FILM COATED ORAL
Qty: 0 | Refills: 0 | DISCHARGE

## 2023-08-30 RX ORDER — APIXABAN 2.5 MG/1
1 TABLET, FILM COATED ORAL
Qty: 0 | Refills: 0 | DISCHARGE

## 2023-08-30 RX ORDER — AMLODIPINE BESYLATE 2.5 MG/1
1 TABLET ORAL
Qty: 0 | Refills: 0 | DISCHARGE

## 2023-08-30 RX ORDER — CARVEDILOL PHOSPHATE 80 MG/1
1 CAPSULE, EXTENDED RELEASE ORAL
Qty: 0 | Refills: 0 | DISCHARGE

## 2023-08-30 NOTE — ASU PATIENT PROFILE, ADULT - FALL HARM RISK - UNIVERSAL INTERVENTIONS
Bed in lowest position, wheels locked, appropriate side rails in place/Call bell, personal items and telephone in reach/Instruct patient to call for assistance before getting out of bed or chair/Non-slip footwear when patient is out of bed/[place] to call system/Physically safe environment - no spills, clutter or unnecessary equipment/Purposeful Proactive Rounding/Room/bathroom lighting operational, light cord in reach

## 2023-08-30 NOTE — PROGRESS NOTE ADULT - SUBJECTIVE AND OBJECTIVE BOX
I have personally seen and examined the patient.  I agree with the history and physical which I have reviewed and noted any changes below   08-30-23 @ 12:09                                                      POST OPERATIVE PROCEDURAL DOCUMENTATION    PRE-OP DIAGNOSIS: A-fib    POST-OP DIAGNOSIS A-fib    PROCEDURE:     After risks, benefits and alternatives of the procedure were explained, consent was signded and placed in the medical record.  Procedural timeout was taken.  Sedation was administered by anesthesia.  MANDY probe inserted without complication and MANDY performed.  Patient tolerated the procedure well without complication.  Post-procedure vital signs were stable.    MANDY findings:  LV Normal wall motion and systolic function  LA Dilated, no thrombus  RV Normal size and function  RA No thrombus  MV Mild MR  AV Mildly thickened  TV Trace TR, normal PASP   IAS Small iatrogenic ASD  Atheroma Mild  See full report for findings.    Cardioversion attempted:  200J  ( X) successful  ( ) unsuccessful  300J  ( ) successful  ( ) unsuccessful  360J  ( ) successful  ( ) unsuccessful    Post procedure rhythm: SR    Recommendations:  Continue all current medications.  Patient to follow up with referring cardiologist in near future.     ANESTHESIA TYPE  [  ] General Anesthesia  [  ] Conscious Sedation  [  ] Local/Regional  [ X ] Deep sedation    CONDITION  [  ] Critical  [  ] Serious  [  ] Fair  [ x ] Good

## 2023-08-30 NOTE — CHART NOTE - NSCHARTNOTEFT_GEN_A_CORE
POST OPERATIVE PROCEDURAL DOCUMENTATION  PRE-OP DIAGNOSIS: Afib    POST-OP DIAGNOSIS: Successful DCCV to NSR    PROCEDURE: Transesophageal echocardiogram    Primary Physician: Dr. Enciso  Assistant: Dr. Barton    ANESTHESIA TYPE  [  ] General Anesthesia  [ x ] Conscious Sedation  [  ] Local/Regional    CONDITION  [  ] Critical  [  ] Serious  [  ] Fair  [ X ] Good    SPECIMENS REMOVED (IF APPLICABLE): N/A    IMPLANTS (IF APPLICABLE): None    ESTIMATED BLOOD LOSS: None    COMPLICATIONS: None      FINDINGS:    After risks and benefits of procedures were explained, informed consent was obtained and placed in chart.   The patient received topical anesthestic to the oropharynx with viscous lidocaine and benzocaine spray.  Refer to Anesthesia note for sedation details.  The MANDY probe was passed into the esophagus without difficulty.  Transesophageal images were obtained.  The MANDY probe was removed without difficulty and examined.  There was no evidence for bleeding.  The patient tolerated the procedure well without any immediate MANDY-related complications.      Preliminary Findings:  BARRY: Left atrial appendage was clear of clot and smoke.  LV: LVEF was estimated at 55-65%  MV: mild MR, No evidence for MS.   AV: No evidence for AI, no evidence for AS.   TV: Mild TR.   IAS: Small iatrogenic ASD with predominate L to R shunt  There was mild, non-mobile atheroma seen in the thoracic aorta.     Patient successfully converted to sinus rhythm with synchronized  200 J of direct current cardioversion.    DIAGNOSIS/IMPRESSION: Successful DCCV to NSR    PLAN OF CARE:  - Cont metoprolol and eliquis  - F/U with cardiologist  - Discharge home when stable POST OPERATIVE PROCEDURAL DOCUMENTATION  PRE-OP DIAGNOSIS: Afib    POST-OP DIAGNOSIS: Successful DCCV to NSR    PROCEDURE: Transesophageal echocardiogram    Primary Physician: Dr. Enciso  Assistant: Dr. Barton    ANESTHESIA TYPE  [  ] General Anesthesia  [ x ] Conscious Sedation  [  ] Local/Regional    CONDITION  [  ] Critical  [  ] Serious  [  ] Fair  [ X ] Good    SPECIMENS REMOVED (IF APPLICABLE): N/A    IMPLANTS (IF APPLICABLE): None    ESTIMATED BLOOD LOSS: None    COMPLICATIONS: None      FINDINGS:    After risks and benefits of procedures were explained, informed consent was obtained and placed in chart.   The patient received topical anesthestic to the oropharynx with viscous lidocaine and benzocaine spray.  Refer to Anesthesia note for sedation details.  The MANDY probe was passed into the esophagus without difficulty.  Transesophageal images were obtained.  The MANDY probe was removed without difficulty and examined.  There was no evidence for bleeding.  The patient tolerated the procedure well without any immediate MANDY-related complications.      Preliminary Findings:  BARRY: Left atrial appendage was clear of clot and smoke.  LV: LVEF was estimated at 55-65%  MV: mild MR, No evidence for MS.   AV: No evidence for AI, no evidence for AS.   TV: Trace TR.   IAS: Small iatrogenic ASD with predominate L to R shunt  There was mild, non-mobile atheroma seen in the thoracic aorta.     Patient successfully converted to sinus rhythm with synchronized  200 J of direct current cardioversion.    DIAGNOSIS/IMPRESSION: Successful DCCV to NSR    PLAN OF CARE:  - Cont metoprolol and eliquis  - F/U with cardiologist  - Discharge home when stable

## 2023-08-30 NOTE — H&P CARDIOLOGY - HISTORY OF PRESENT ILLNESS
73 Yr M PMH CAD s/p CABG and PCI, Afib s/p 3 DCCV and ablation presenting for MANDY/CV. Last dose Eliquis this am

## 2023-09-06 DIAGNOSIS — I48.91 UNSPECIFIED ATRIAL FIBRILLATION: ICD-10-CM

## 2023-09-08 ENCOUNTER — APPOINTMENT (OUTPATIENT)
Dept: CARDIOLOGY | Facility: CLINIC | Age: 73
End: 2023-09-08
Payer: MEDICARE

## 2023-09-08 VITALS
WEIGHT: 232 LBS | BODY MASS INDEX: 34.36 KG/M2 | SYSTOLIC BLOOD PRESSURE: 140 MMHG | HEIGHT: 69 IN | DIASTOLIC BLOOD PRESSURE: 72 MMHG | HEART RATE: 77 BPM

## 2023-09-08 DIAGNOSIS — Z98.61 CORONARY ANGIOPLASTY STATUS: ICD-10-CM

## 2023-09-08 PROCEDURE — 93000 ELECTROCARDIOGRAM COMPLETE: CPT

## 2023-09-08 PROCEDURE — 99214 OFFICE O/P EST MOD 30 MIN: CPT

## 2023-09-08 RX ORDER — SOTALOL HYDROCHLORIDE TABLES AF 80 MG/1
80 TABLET ORAL
Qty: 30 | Refills: 1 | Status: ACTIVE | COMMUNITY
Start: 2023-09-08 | End: 1900-01-01

## 2023-09-08 RX ORDER — AMLODIPINE BESYLATE 10 MG/1
10 TABLET ORAL
Qty: 90 | Refills: 1 | Status: DISCONTINUED | COMMUNITY
Start: 2020-10-19 | End: 2023-09-08

## 2023-09-08 NOTE — HISTORY OF PRESENT ILLNESS
[FreeTextEntry1] : 72 y/o male with h/o CAD, CABG in 2005 (LIMA to LAD, sequential SVGs to D1 and OM2 from LIMA). LHC in 2014: patent grafts and RCA, subtotal stenosis of OM1 (stented successfully with UZMA). PAD post right patch atherectomy   S/p A-fib ablation in Nov 2022.   Pt had established care Dr Siva Patel in [place] due to proximity of his residency.  Pt had Nuclear Stress test that was ordered by Dr Patel which he reports is WNL.  Pt was started on Multaq which he couldnt tolerate due to multiple side affects such as nausea, fatigue, SOB.   Pt presents for a follow up visit. He is s/p MANDY/CV last week. He reports feeling better, states SOB has resolved. Denies chest pain, palpitations. BP at home 130s/70s. [place]

## 2023-09-08 NOTE — ASSESSMENT
[FreeTextEntry1] : 72 y/o male with h/o CAD, s/p CABG. No angina, no evidence of ischemia. HTN controlled now. Persistent A-fib, A-flutter. S/p ablation in 11/22. s/p recent recurrence and CV.  Obesity. Hyperlipidemia. PAD post right patch atherectomy. Will obtain report.  Plan: F/u with EP scheduled Continue treatment. Add Sotalol 40 mg bid (start 2 days prior to the EP visit so QT can be checked). Diet, weight loss discussed. F/u in 3 months.  Mark Anthony Enciso MD

## 2023-09-08 NOTE — CARDIOLOGY SUMMARY
[de-identified] : 9/8/23: SR with PVCs, nonspecific T wave changes. [de-identified] : 09/08/22:\par  LVEF 60%, G2DD\par  Mod LAE\par  Mild MR, TR.\par  \par  09/17/20:\par  LVEF 63%, G2DD\par  Moderate LAE\par  Mild MR. [de-identified] : Adenosine MPI 09/17/21:\par  No ischemia.

## 2023-09-08 NOTE — PHYSICAL EXAM
[Well Developed] : well developed [No Acute Distress] : no acute distress [Obese] : obese [Normal Conjunctiva] : normal conjunctiva [Normal Venous Pressure] : normal venous pressure [No Rub] : no rub [No Carotid Bruit] : no carotid bruit [No Gallop] : no gallop [Clear Lung Fields] : clear lung fields [Good Air Entry] : good air entry [No Respiratory Distress] : no respiratory distress  [Soft] : abdomen soft [Non Tender] : non-tender [Normal Bowel Sounds] : normal bowel sounds [Normal Gait] : normal gait [No Cyanosis] : no cyanosis [No Clubbing] : no clubbing [No Varicosities] : no varicosities [Edema ___] : edema [unfilled] [No Rash] : no rash [Moves all extremities] : moves all extremities [Normal Speech] : normal speech [Alert and Oriented] : alert and oriented [Normal memory] : normal memory [de-identified] : irregular S1 and S2

## 2023-09-15 ENCOUNTER — APPOINTMENT (OUTPATIENT)
Dept: ELECTROPHYSIOLOGY | Facility: CLINIC | Age: 73
End: 2023-09-15
Payer: MEDICARE

## 2023-09-15 VITALS
BODY MASS INDEX: 34.36 KG/M2 | TEMPERATURE: 98 F | HEIGHT: 69 IN | SYSTOLIC BLOOD PRESSURE: 112 MMHG | HEART RATE: 67 BPM | DIASTOLIC BLOOD PRESSURE: 70 MMHG | WEIGHT: 232 LBS

## 2023-09-15 PROCEDURE — 93000 ELECTROCARDIOGRAM COMPLETE: CPT

## 2023-09-15 PROCEDURE — 99214 OFFICE O/P EST MOD 30 MIN: CPT

## 2023-09-15 RX ORDER — APIXABAN 5 MG/1
5 TABLET, FILM COATED ORAL
Qty: 180 | Refills: 1 | Status: ACTIVE | COMMUNITY
Start: 2022-06-16

## 2023-09-15 RX ORDER — VALSARTAN AND HYDROCHLOROTHIAZIDE 320; 12.5 MG/1; MG/1
320-12.5 TABLET, FILM COATED ORAL
Qty: 90 | Refills: 0 | Status: ACTIVE | COMMUNITY
Start: 2020-10-19

## 2023-09-21 ENCOUNTER — APPOINTMENT (OUTPATIENT)
Dept: CARDIOLOGY | Facility: CLINIC | Age: 73
End: 2023-09-21
Payer: MEDICARE

## 2023-09-21 VITALS
HEIGHT: 69 IN | DIASTOLIC BLOOD PRESSURE: 72 MMHG | HEART RATE: 66 BPM | WEIGHT: 227 LBS | SYSTOLIC BLOOD PRESSURE: 148 MMHG | BODY MASS INDEX: 33.62 KG/M2

## 2023-09-21 DIAGNOSIS — Z00.00 ENCOUNTER FOR GENERAL ADULT MEDICAL EXAMINATION W/OUT ABNORMAL FINDINGS: ICD-10-CM

## 2023-09-21 PROCEDURE — 99214 OFFICE O/P EST MOD 30 MIN: CPT

## 2023-09-21 PROCEDURE — 93000 ELECTROCARDIOGRAM COMPLETE: CPT

## 2023-09-21 RX ORDER — CARVEDILOL 25 MG/1
25 TABLET, FILM COATED ORAL
Qty: 180 | Refills: 1 | Status: ACTIVE | COMMUNITY
Start: 2020-09-05 | End: 1900-01-01

## 2023-10-03 ENCOUNTER — RX RENEWAL (OUTPATIENT)
Age: 73
End: 2023-10-03

## 2023-12-15 ENCOUNTER — OFFICE VISIT (OUTPATIENT)
Dept: URBAN - METROPOLITAN AREA CLINIC 57 | Facility: CLINIC | Age: 73
End: 2023-12-15

## 2023-12-21 ENCOUNTER — APPOINTMENT (OUTPATIENT)
Dept: CARDIOLOGY | Facility: CLINIC | Age: 73
End: 2023-12-21
Payer: MEDICARE

## 2023-12-21 VITALS
BODY MASS INDEX: 43 KG/M2 | SYSTOLIC BLOOD PRESSURE: 143 MMHG | WEIGHT: 219 LBS | HEART RATE: 67 BPM | HEIGHT: 60 IN | DIASTOLIC BLOOD PRESSURE: 78 MMHG

## 2023-12-21 DIAGNOSIS — I25.9 CHRONIC ISCHEMIC HEART DISEASE, UNSPECIFIED: ICD-10-CM

## 2023-12-21 DIAGNOSIS — E78.5 HYPERLIPIDEMIA, UNSPECIFIED: ICD-10-CM

## 2023-12-21 DIAGNOSIS — D64.9 ANEMIA, UNSPECIFIED: ICD-10-CM

## 2023-12-21 DIAGNOSIS — I48.19 OTHER PERSISTENT ATRIAL FIBRILLATION: ICD-10-CM

## 2023-12-21 DIAGNOSIS — I10 ESSENTIAL (PRIMARY) HYPERTENSION: ICD-10-CM

## 2023-12-21 PROCEDURE — 99214 OFFICE O/P EST MOD 30 MIN: CPT

## 2023-12-21 PROCEDURE — 93000 ELECTROCARDIOGRAM COMPLETE: CPT

## 2023-12-21 RX ORDER — SOTALOL HYDROCHLORIDE TABLES AF 80 MG/1
80 TABLET ORAL
Qty: 90 | Refills: 1 | Status: ACTIVE | COMMUNITY
Start: 2023-10-03 | End: 1900-01-01

## 2023-12-21 NOTE — CARDIOLOGY SUMMARY
[de-identified] : 12/21/23: SR 67bpm, nonspecific T wave changes, QTc 492. [de-identified] : 09/08/22:\par  LVEF 60%, G2DD\par  Mod LAE\par  Mild MR, TR.\par  \par  09/17/20:\par  LVEF 63%, G2DD\par  Moderate LAE\par  Mild MR. [de-identified] : Adenosine MPI 09/17/21:\par  No ischemia.

## 2023-12-21 NOTE — REVIEW OF SYSTEMS
[Negative] : Neurological [Fever] : no fever [Chills] : no chills [Feeling Fatigued] : feeling fatigued [Lower Ext Edema] : no extremity edema [Anxiety] : no anxiety [Easy Bleeding] : no tendency for easy bleeding [Easy Bruising] : no tendency for easy bruising [FreeTextEntry9] : Back pain

## 2023-12-21 NOTE — ASSESSMENT
[FreeTextEntry1] : 74 y/o male with h/o CAD, s/p CABG. No angina, no evidence of ischemia. HTN controlled now. Persistent A-fib, A-flutter. S/p ablation in 11/22. s/p recent recurrence and CV. Maintaining SR on Sotalol, QTc still acceptable. Obesity. Hyperlipidemia. PAD post right patch atherectomy.  Symptomatic anemia due to blood loss.  Plan: Patient advised to avoid medications that prolong QT. Continue Eliquis, d/c ASA.  Patient is scheduled for EGD/colonoscopy, will try to expedite. Hematology f/u for iron infusion scheduled. CBP, CMP, iron studies today. Watchman discussed with patient. F/u depending on test results.  Mark Anthony Enciso MD.

## 2023-12-21 NOTE — HISTORY OF PRESENT ILLNESS
[FreeTextEntry1] : 74 y/o male with h/o CAD, CABG in 2005 (LIMA to LAD, sequential SVGs to D1 and OM2 from LIMA). LHC in 2014: patent grafts and RCA, subtotal stenosis of OM1 (stented successfully with UZMA). PAD post right patch atherectomy   S/p A-fib ablation in Nov 2022.   Pt had established care Dr Siva Patel in [place] due to proximity of his residency.  Pt had Nuclear Stress test that was ordered by Dr Patel which he reports is WNL.  Pt was started on Multaq which he couldn't tolerate due to multiple side affects such as nausea, fatigue, SOB.   He is s/p MANDY/CV 9/2023  Pt presents for a follow up visit. He was having severe HERNANDEZ, turned out to be severely anemic, feels much better on iron pills, scheduled for iron infusion and EGD/colonoscopy. Tolerating Sotalol. BP <120-130/70s-80.  HGB 7.5 Iron 27 Iron saturation 7.

## 2023-12-21 NOTE — PHYSICAL EXAM
[Well Developed] : well developed [No Acute Distress] : no acute distress [Obese] : obese [Normal Conjunctiva] : normal conjunctiva [Normal Venous Pressure] : normal venous pressure [No Carotid Bruit] : no carotid bruit [No Rub] : no rub [No Gallop] : no gallop [Clear Lung Fields] : clear lung fields [Good Air Entry] : good air entry [No Respiratory Distress] : no respiratory distress  [Soft] : abdomen soft [Non Tender] : non-tender [Normal Bowel Sounds] : normal bowel sounds [Normal Gait] : normal gait [No Cyanosis] : no cyanosis [No Clubbing] : no clubbing [No Varicosities] : no varicosities [Edema ___] : edema [unfilled] [No Rash] : no rash [Moves all extremities] : moves all extremities [Normal Speech] : normal speech [Alert and Oriented] : alert and oriented [Normal memory] : normal memory [de-identified] : irregular S1 and S2

## 2023-12-22 ENCOUNTER — APPOINTMENT (OUTPATIENT)
Dept: ELECTROPHYSIOLOGY | Facility: CLINIC | Age: 73
End: 2023-12-22

## 2023-12-22 LAB
ALBUMIN SERPL ELPH-MCNC: 3.7 G/DL
ALP BLD-CCNC: 103 U/L
ALT SERPL-CCNC: 11 U/L
ANION GAP SERPL CALC-SCNC: 11 MMOL/L
AST SERPL-CCNC: 21 U/L
BILIRUB SERPL-MCNC: 0.6 MG/DL
BUN SERPL-MCNC: 12 MG/DL
CALCIUM SERPL-MCNC: 9.1 MG/DL
CHLORIDE SERPL-SCNC: 103 MMOL/L
CO2 SERPL-SCNC: 24 MMOL/L
CREAT SERPL-MCNC: 1 MG/DL
EGFR: 79 ML/MIN/1.73M2
FERRITIN SERPL-MCNC: 17 NG/ML
GLUCOSE SERPL-MCNC: 102 MG/DL
HCT VFR BLD CALC: 33.2 %
HGB BLD-MCNC: 8.3 G/DL
INR PPP: 1.93 RATIO
IRON SATN MFR SERPL: 7 %
IRON SERPL-MCNC: 22 UG/DL
MCHC RBC-ENTMCNC: 17.5 PG
MCHC RBC-ENTMCNC: 25 G/DL
MCV RBC AUTO: 69.9 FL
PLATELET # BLD AUTO: 410 K/UL
PMV BLD: 8.8 FL
POTASSIUM SERPL-SCNC: 4.5 MMOL/L
PROT SERPL-MCNC: 6.2 G/DL
PT BLD: 22.2 SEC
RBC # BLD: 4.75 M/UL
RBC # FLD: 28.8 %
SODIUM SERPL-SCNC: 138 MMOL/L
TIBC SERPL-MCNC: 313 UG/DL
UIBC SERPL-MCNC: 291 UG/DL
WBC # FLD AUTO: 8.53 K/UL

## 2024-01-01 NOTE — ASU PATIENT PROFILE, ADULT - AS SC BRADEN FRICTION
(3) no apparent problem [Nl] : no feeding issues at this time. [] :  [___ Formula] : [unfilled] Formula  [___ ounces/feeding] : ~YUDELKA liz/feeding [___ Times/day] : [unfilled] times/day [Acting Fussy] : not acting ~L fussy [Fever] : no fever [Wgt Loss (___ Lbs)] : no recent weight loss [Pallor] : not pale [Discharge] : no discharge [Redness] : no redness [Nasal Discharge] : no nasal discharge [Nasal Stuffiness] : no nasal congestion [Stridor] : no stridor [Cyanosis] : no cyanosis [Edema] : no edema [Diaphoresis] : not diaphoretic [Tachypnea] : not tachypneic [Wheezing] : no wheezing [Cough] : no cough [Being A Poor Eater] : not a poor eater [Vomiting] : no vomiting [Diarrhea] : no diarrhea [Decrease In Appetite] : appetite not decreased [Fainting (Syncope)] : no fainting [Dec Consciousness] :  no decrease in consciousness [Seizure] : no seizures [Hypotonicity (Flaccid)] : not hypotonic [Refusal to Bear Wgt] : normal weight bearing [Puffy Hands/Feet] : no hand/feet puffiness [Rash] : no rash [Hemangioma] : no hemangioma [Jaundice] : no jaundice [Wound problems] : no wound problems [Bruising] : no tendency for easy bruising [Swollen Glands] : no lymphadenopathy [Enlarged [place]] : the fontanelle was not enlarged [Hoarse Cry] : no hoarse cry [Failure To Thrive] : no failure to thrive [Penis Circumcised] : not circumcised [Undescended Testes] : no undescended testicle [Ambiguous Genitals] : genitals not ambiguous [Dec Urine Output] : no oliguria [Solid Foods] : No solid food at this time

## 2024-05-01 RX ORDER — ATORVASTATIN CALCIUM 20 MG/1
20 TABLET, FILM COATED ORAL
Qty: 90 | Refills: 1 | Status: ACTIVE | COMMUNITY
Start: 2020-09-05 | End: 1900-01-01

## 2024-06-27 ENCOUNTER — APPOINTMENT (OUTPATIENT)
Dept: CARDIOLOGY | Facility: CLINIC | Age: 74
End: 2024-06-27

## 2024-12-27 ENCOUNTER — NON-APPOINTMENT (OUTPATIENT)
Age: 74
End: 2024-12-27

## 2024-12-27 ENCOUNTER — APPOINTMENT (OUTPATIENT)
Dept: CARDIOLOGY | Facility: CLINIC | Age: 74
End: 2024-12-27
Payer: MEDICARE

## 2024-12-27 VITALS
WEIGHT: 203 LBS | HEIGHT: 60 IN | BODY MASS INDEX: 39.85 KG/M2 | SYSTOLIC BLOOD PRESSURE: 132 MMHG | DIASTOLIC BLOOD PRESSURE: 76 MMHG | HEART RATE: 60 BPM

## 2024-12-27 DIAGNOSIS — I48.92 UNSPECIFIED ATRIAL FLUTTER: ICD-10-CM

## 2024-12-27 DIAGNOSIS — Z98.61 CORONARY ANGIOPLASTY STATUS: ICD-10-CM

## 2024-12-27 DIAGNOSIS — E78.5 HYPERLIPIDEMIA, UNSPECIFIED: ICD-10-CM

## 2024-12-27 DIAGNOSIS — I10 ESSENTIAL (PRIMARY) HYPERTENSION: ICD-10-CM

## 2024-12-27 DIAGNOSIS — Z91.89 OTHER SPECIFIED PERSONAL RISK FACTORS, NOT ELSEWHERE CLASSIFIED: ICD-10-CM

## 2024-12-27 DIAGNOSIS — I48.19 OTHER PERSISTENT ATRIAL FIBRILLATION: ICD-10-CM

## 2024-12-27 DIAGNOSIS — I25.9 CHRONIC ISCHEMIC HEART DISEASE, UNSPECIFIED: ICD-10-CM

## 2024-12-27 PROCEDURE — 93000 ELECTROCARDIOGRAM COMPLETE: CPT

## 2024-12-27 PROCEDURE — 99214 OFFICE O/P EST MOD 30 MIN: CPT

## 2024-12-27 RX ORDER — SPIRONOLACTONE 25 MG/1
25 TABLET ORAL DAILY
Refills: 0 | Status: ACTIVE | COMMUNITY

## 2024-12-27 RX ORDER — EMPAGLIFLOZIN 10 MG/1
10 TABLET, FILM COATED ORAL DAILY
Refills: 0 | Status: ACTIVE | COMMUNITY

## 2024-12-27 RX ORDER — ASPIRIN 81 MG
81 TABLET, DELAYED RELEASE (ENTERIC COATED) ORAL
Refills: 0 | Status: ACTIVE | COMMUNITY

## 2024-12-28 PROBLEM — Z91.89 AT RISK FOR CORONARY ARTERY DISEASE: Status: RESOLVED | Noted: 2021-12-20 | Resolved: 2024-12-28

## 2024-12-28 RX ORDER — METRONIDAZOLE 500 MG/1
500 TABLET ORAL
Qty: 2 | Refills: 0 | Status: DISCONTINUED | COMMUNITY
Start: 2024-07-10

## 2024-12-28 RX ORDER — AMLODIPINE BESYLATE 5 MG/1
5 TABLET ORAL
Qty: 90 | Refills: 0 | Status: ACTIVE | COMMUNITY
Start: 2024-08-24

## 2024-12-28 RX ORDER — GABAPENTIN 100 MG/1
100 CAPSULE ORAL
Qty: 90 | Refills: 0 | Status: ACTIVE | COMMUNITY
Start: 2024-08-13

## 2024-12-28 RX ORDER — SOTALOL HYDROCHLORIDE 80 MG/1
80 TABLET ORAL
Qty: 180 | Refills: 0 | Status: ACTIVE | COMMUNITY
Start: 2024-10-28

## 2024-12-28 RX ORDER — TRAMADOL HYDROCHLORIDE 50 MG/1
50 TABLET, COATED ORAL
Qty: 7 | Refills: 0 | Status: ACTIVE | COMMUNITY
Start: 2024-09-05

## 2024-12-28 RX ORDER — ONDANSETRON 4 MG/1
4 TABLET ORAL
Qty: 2 | Refills: 0 | Status: ACTIVE | COMMUNITY
Start: 2024-07-10

## 2024-12-28 RX ORDER — NEOMYCIN SULFATE 500 MG/1
500 TABLET ORAL
Qty: 4 | Refills: 0 | Status: DISCONTINUED | COMMUNITY
Start: 2024-07-10

## 2025-06-24 ENCOUNTER — RX RENEWAL (OUTPATIENT)
Age: 75
End: 2025-06-24

## 2025-06-25 ENCOUNTER — NON-APPOINTMENT (OUTPATIENT)
Age: 75
End: 2025-06-25

## 2025-06-27 ENCOUNTER — APPOINTMENT (OUTPATIENT)
Dept: CARDIOLOGY | Facility: CLINIC | Age: 75
End: 2025-06-27
Payer: MEDICARE

## 2025-06-27 VITALS
HEIGHT: 65 IN | HEART RATE: 56 BPM | DIASTOLIC BLOOD PRESSURE: 72 MMHG | SYSTOLIC BLOOD PRESSURE: 130 MMHG | WEIGHT: 210 LBS | BODY MASS INDEX: 34.99 KG/M2

## 2025-06-27 PROBLEM — R94.31 PROLONGED QT INTERVAL: Status: ACTIVE | Noted: 2025-06-27

## 2025-06-27 PROCEDURE — 93000 ELECTROCARDIOGRAM COMPLETE: CPT

## 2025-06-27 PROCEDURE — 99214 OFFICE O/P EST MOD 30 MIN: CPT

## 2025-06-27 RX ORDER — SOTALOL HYDROCHLORIDE TABLES AF 120 MG/1
120 TABLET ORAL
Refills: 0 | Status: ACTIVE | COMMUNITY

## 2025-09-15 ENCOUNTER — APPOINTMENT (OUTPATIENT)
Dept: CARDIOLOGY | Facility: CLINIC | Age: 75
End: 2025-09-15
Payer: MEDICARE

## 2025-09-15 VITALS
BODY MASS INDEX: 35.32 KG/M2 | WEIGHT: 212 LBS | SYSTOLIC BLOOD PRESSURE: 172 MMHG | DIASTOLIC BLOOD PRESSURE: 102 MMHG | HEIGHT: 65 IN | HEART RATE: 78 BPM

## 2025-09-15 DIAGNOSIS — I25.9 CHRONIC ISCHEMIC HEART DISEASE, UNSPECIFIED: ICD-10-CM

## 2025-09-15 DIAGNOSIS — I48.19 OTHER PERSISTENT ATRIAL FIBRILLATION: ICD-10-CM

## 2025-09-15 DIAGNOSIS — E78.5 HYPERLIPIDEMIA, UNSPECIFIED: ICD-10-CM

## 2025-09-15 DIAGNOSIS — Z98.61 CORONARY ANGIOPLASTY STATUS: ICD-10-CM

## 2025-09-15 DIAGNOSIS — I48.92 UNSPECIFIED ATRIAL FLUTTER: ICD-10-CM

## 2025-09-15 DIAGNOSIS — I10 ESSENTIAL (PRIMARY) HYPERTENSION: ICD-10-CM

## 2025-09-15 PROCEDURE — 99214 OFFICE O/P EST MOD 30 MIN: CPT

## 2025-09-15 PROCEDURE — 93000 ELECTROCARDIOGRAM COMPLETE: CPT

## 2025-09-15 RX ORDER — VALSARTAN 80 MG/1
80 TABLET, COATED ORAL DAILY
Qty: 90 | Refills: 0 | Status: ACTIVE | COMMUNITY
Start: 2025-09-15 | End: 1900-01-01